# Patient Record
Sex: FEMALE | Race: WHITE | NOT HISPANIC OR LATINO | Employment: FULL TIME | URBAN - METROPOLITAN AREA
[De-identification: names, ages, dates, MRNs, and addresses within clinical notes are randomized per-mention and may not be internally consistent; named-entity substitution may affect disease eponyms.]

---

## 2016-08-08 LAB — CRC RECOMMENDATION EXT: NORMAL

## 2018-12-14 PROBLEM — I10 ESSENTIAL HYPERTENSION: Status: ACTIVE | Noted: 2018-12-14

## 2020-02-12 PROBLEM — F41.8 DEPRESSION WITH ANXIETY: Status: ACTIVE | Noted: 2020-02-12

## 2020-02-12 PROBLEM — E03.9 HYPOTHYROIDISM: Status: ACTIVE | Noted: 2020-02-12

## 2020-02-12 PROBLEM — E66.811 CLASS 1 OBESITY DUE TO EXCESS CALORIES WITH SERIOUS COMORBIDITY AND BODY MASS INDEX (BMI) OF 32.0 TO 32.9 IN ADULT: Status: ACTIVE | Noted: 2020-02-12

## 2020-02-12 PROBLEM — E66.09 CLASS 1 OBESITY DUE TO EXCESS CALORIES WITH SERIOUS COMORBIDITY AND BODY MASS INDEX (BMI) OF 32.0 TO 32.9 IN ADULT: Status: ACTIVE | Noted: 2020-02-12

## 2020-03-04 ENCOUNTER — OFFICE VISIT (OUTPATIENT)
Dept: UROLOGY | Facility: CLINIC | Age: 60
End: 2020-03-04
Payer: COMMERCIAL

## 2020-03-04 ENCOUNTER — HOSPITAL ENCOUNTER (OUTPATIENT)
Dept: RADIOLOGY | Facility: HOSPITAL | Age: 60
Discharge: HOME | End: 2020-03-04
Attending: UROLOGY
Payer: COMMERCIAL

## 2020-03-04 VITALS — HEIGHT: 62 IN | WEIGHT: 182.56 LBS | BODY MASS INDEX: 33.6 KG/M2

## 2020-03-04 DIAGNOSIS — R31.9 HEMATURIA, UNSPECIFIED TYPE: ICD-10-CM

## 2020-03-04 DIAGNOSIS — R10.9 FLANK PAIN: ICD-10-CM

## 2020-03-04 DIAGNOSIS — R10.84 GENERALIZED ABDOMINAL PAIN: ICD-10-CM

## 2020-03-04 DIAGNOSIS — R31.29 MICROHEMATURIA: ICD-10-CM

## 2020-03-04 DIAGNOSIS — R39.89 BLADDER PAIN: ICD-10-CM

## 2020-03-04 DIAGNOSIS — N23 RENAL COLIC: ICD-10-CM

## 2020-03-04 LAB
BILIRUB SERPL-MCNC: ABNORMAL MG/DL
BLOOD URINE, POC: ABNORMAL
COLOR, POC UA: ABNORMAL
GLUCOSE UR QL STRIP: ABNORMAL
KETONES UR QL STRIP: ABNORMAL
LEUKOCYTE ESTERASE URINE, POC: ABNORMAL
NITRITE, POC UA: ABNORMAL
PH, POC UA: 5
PROTEIN, POC: ABNORMAL
SPECIFIC GRAVITY, POC UA: 1.02
UROBILINOGEN, POC UA: 0.2

## 2020-03-04 PROCEDURE — 99204 OFFICE O/P NEW MOD 45 MIN: CPT | Mod: 25,S$GLB,, | Performed by: UROLOGY

## 2020-03-04 PROCEDURE — 99999 PR PBB SHADOW E&M-EST. PATIENT-LVL III: ICD-10-PCS | Mod: PBBFAC,,, | Performed by: UROLOGY

## 2020-03-04 PROCEDURE — 74176 CT ABD & PELVIS W/O CONTRAST: CPT | Mod: TC,PO

## 2020-03-04 PROCEDURE — 81002 URINALYSIS NONAUTO W/O SCOPE: CPT | Mod: S$GLB,,, | Performed by: UROLOGY

## 2020-03-04 PROCEDURE — 81002 POCT URINE DIPSTICK WITHOUT MICROSCOPE: ICD-10-PCS | Mod: S$GLB,,, | Performed by: UROLOGY

## 2020-03-04 PROCEDURE — 99204 PR OFFICE/OUTPT VISIT, NEW, LEVL IV, 45-59 MIN: ICD-10-PCS | Mod: 25,S$GLB,, | Performed by: UROLOGY

## 2020-03-04 PROCEDURE — 74176 CT RENAL STONE STUDY ABD PELVIS WO: ICD-10-PCS | Mod: 26,,, | Performed by: RADIOLOGY

## 2020-03-04 PROCEDURE — 99999 PR PBB SHADOW E&M-EST. PATIENT-LVL III: CPT | Mod: PBBFAC,,, | Performed by: UROLOGY

## 2020-03-04 PROCEDURE — 3008F BODY MASS INDEX DOCD: CPT | Mod: CPTII,S$GLB,, | Performed by: UROLOGY

## 2020-03-04 PROCEDURE — 3008F PR BODY MASS INDEX (BMI) DOCUMENTED: ICD-10-PCS | Mod: CPTII,S$GLB,, | Performed by: UROLOGY

## 2020-03-04 PROCEDURE — 74176 CT ABD & PELVIS W/O CONTRAST: CPT | Mod: 26,,, | Performed by: RADIOLOGY

## 2020-03-04 NOTE — LETTER
March 4, 2020      Joanie Sarah MD  [address]  [address]  [place] [address]           [facility]  [address] [place] [address]  Phone: [phone]  Fax: [phone]          Patient: Juliana Ochoa   MR Number: [id number]   YOB: 1960   Date of Visit: 3/4/2020       Dear Dr. Joanie Sarah:    Thank you for referring Juliana Ochoa to me for evaluation. Attached you will find relevant portions of my assessment and plan of care.    If you have questions, please do not hesitate to call me. I look forward to following Juliana Ochoa along with you.    Sincerely,    Noman Mills MD    Enclosure  CC:  No Recipients    If you would like to receive this communication electronically, please contact [email or web address] or [phone] to request more information on [employer] Link access.    For providers and/or their staff who would like to refer a patient to [facility], please contact us through our one-stop-shop provider referral line, [facility], at [phone].    If you feel you have received this communication in error or would no longer like to receive these types of communications, please e-mail [email or web address]

## 2020-03-04 NOTE — PROGRESS NOTES
Subjective:       Patient ID: Juliana Ochoa is a 59 y.o. female.    Chief Complaint: Complains of intermittent left lower quadrant pain. Recently passe some blood in urine.    Patient recently passed some blood in urine. She has had some intermittent left flank pain. No nausea or vomiting    Past Medical History:   Diagnosis Date    Asthma     Esophageal dilatation     GERD (gastroesophageal reflux disease)     Hypertension     Hypothyroidism     Peptic ulcer       Past Surgical History:   Procedure Laterality Date     SECTION      UPPER GASTROINTESTINAL ENDOSCOPY       Social History     Socioeconomic History    Marital status: Single     Spouse name: Not on file    Number of children: Not on file    Years of education: Not on file    Highest education level: Not on file   Occupational History    Not on file   Social Needs    Financial resource strain: Not on file    Food insecurity:     Worry: Not on file     Inability: Not on file    Transportation needs:     Medical: Not on file     Non-medical: Not on file   Tobacco Use    Smoking status: Never Smoker    Smokeless tobacco: Never Used   Substance and Sexual Activity    Alcohol use: Yes     Alcohol/week: 0.0 standard drinks     Comment: occasional    Drug use: No    Sexual activity: Not on file   Lifestyle    Physical activity:     Days per week: Not on file     Minutes per session: Not on file    Stress: Not on file   Relationships    Social connections:     Talks on phone: Not on file     Gets together: Not on file     Attends Sabianist service: Not on file     Active member of club or organization: Not on file     Attends meetings of clubs or organizations: Not on file     Relationship status: Not on file   Other Topics Concern    Not on file   Social History Narrative    Not on file       Family History   Problem Relation Age of Onset    Heart disease Mother     Glaucoma Mother     Stroke Father     Coronary artery  disease Father       Review of patient's allergies indicates:   Allergen Reactions    Codeine Nausea And Vomiting     Medication List with Changes/Refills   Current Medications    ALBUTEROL (PROAIR HFA) 90 MCG/ACTUATION INHALER    Inhale 2 puffs into the lungs every 4 (four) hours as needed for Shortness of Breath. Rescue    ATENOLOL (TENORMIN) 50 MG TABLET    Take 1 tablet (50 mg total) by mouth once daily.    BECLOMETHASONE (QVAR) 80 MCG/ACTUATION AERO    Inhale 1 puff into the lungs 2 (two) times daily. Controller    BUPROPION (WELLBUTRIN) 75 MG TABLET    Take 1 tablet (75 mg total) by mouth 2 (two) times daily.    DOXYCYCLINE CALCIUM ORAL    Take by mouth.    HYDROCORTISONE 2.5 % CREAM    APPLY to RASH on face when flared twice daily    LEVOTHYROXINE (SYNTHROID) 75 MCG TABLET    Take 1 tablet (75 mcg total) by mouth before breakfast.      Review of Systems   Constitutional: Positive for activity change and fatigue.   HENT: Negative.    Eyes: Negative.    Respiratory: Negative.    Cardiovascular: Negative.    Gastrointestinal: Positive for abdominal pain.   Endocrine: Negative.    Genitourinary: Positive for hematuria.   Musculoskeletal: Negative.    Neurological: Negative.    Hematological: Negative.    Psychiatric/Behavioral: Negative.        Objective:      Physical Exam   Nursing note and vitals reviewed.  Constitutional: She is oriented to person, place, and time. She appears well-developed and well-nourished.   HENT:   Head: Normocephalic and atraumatic.   Eyes: EOM are normal. Pupils are equal, round, and reactive to light.   Neck: Normal range of motion. Neck supple.   Cardiovascular: Normal rate.    Pulmonary/Chest: Effort normal.   Abdominal: Soft.   Musculoskeletal: Normal range of motion.   Neurological: She is alert and oriented to person, place, and time.   Skin: Skin is warm and dry.     Psychiatric: She has a normal mood and affect. Her behavior is normal. Judgment and thought content normal.          Sodium   Date Value Ref Range Status   02/12/2020 141 136 - 145 mmol/L Final     Potassium   Date Value Ref Range Status   02/12/2020 4.3 3.5 - 5.1 mmol/L Final     Chloride   Date Value Ref Range Status   02/12/2020 105 95 - 110 mmol/L Final     CO2   Date Value Ref Range Status   02/12/2020 24 22 - 31 mmol/L Final     Creatinine   Date Value Ref Range Status   02/12/2020 0.73 0.50 - 1.40 mg/dL Final     Glucose   Date Value Ref Range Status   02/12/2020 102 70 - 110 mg/dL Final     Comment:     The ADA recommends the following guidelines for fasting glucose:  Normal:       less than 100 mg/dL  Prediabetes:  100 mg/dL to 125 mg/dL  Diabetes:     126 mg/dL or higher       AST   Date Value Ref Range Status   02/12/2020 21 14 - 36 U/L Final     ALT   Date Value Ref Range Status   02/12/2020 19 0 - 35 U/L Final     WBC   Date Value Ref Range Status   02/12/2020 6.37 3.90 - 12.70 K/uL Final     Hemoglobin   Date Value Ref Range Status   02/12/2020 14.9 12.0 - 16.0 g/dL Final     Hematocrit   Date Value Ref Range Status   02/12/2020 46.9 37.0 - 48.5 % Final     Platelets   Date Value Ref Range Status   02/12/2020 225 150 - 350 K/uL Final          Assessment/Plan: Left lower quadrant pain with recent hematuria - Check ct scan stone                                           protocol                                        If ct negative, will plan cystoscopy        Problem List Items Addressed This Visit     None      Visit Diagnoses     Hematuria, unspecified type        Relevant Orders    POCT urine dipstick without microscope (Completed)    Bladder pain        Relevant Orders    POCT urine dipstick without microscope (Completed)    Generalized abdominal pain        Relevant Orders    CT Renal Stone Study ABD Pelvis WO    Flank pain        Relevant Orders    CT Renal Stone Study ABD Pelvis WO

## 2020-03-08 PROBLEM — N23 RENAL COLIC: Status: ACTIVE | Noted: 2020-03-08

## 2020-03-08 PROBLEM — R31.29 MICROHEMATURIA: Status: ACTIVE | Noted: 2020-03-08

## 2020-12-15 NOTE — PROGRESS NOTES
======================================================  GOAL of current visit: ESTABLISH CARE  Previous PCP: Dr. Joanie Sarah [facility]  List all Specialists: Opthalmology:  Dr. Munoz                                  Urology:  Dr. Gillian Mills                                  GYN:  Dr. Yael Loyola [facility]  Date of Last Annual Wellness:  ======================================================  PLAN:      Problem List Items Addressed This Visit     Class 2 severe obesity due to excess calories with serious comorbidity and body mass index (BMI) of 38.0 to 38.9 in adult    Rash - Primary     Patient has tried multiple different creams and ointments without relief.  She has not tried treating for fungal disease.  I recommend patient follow-up with a dermatologist for a biopsy soon.  Will try Lotrisone in the meantime.Discussed condition course and signs and symptoms to expect.  Patient advised take anti-inflammatories and or Tylenol for pain or fever.  ER precautions.  Call MD or follow-up to clinic if not improving or worsening symptoms.           Relevant Medications    clotrimazole-betamethasone 1-0.05% (LOTRISONE) cream    Other Relevant Orders    Ambulatory referral/consult to Dermatology    Encounter for long-term (current) use of medications     Reviewed labs.Complete history and physical was completed today.  Complete and thorough medication reconciliation was performed.  Discussed risks and benefits of medications.  Advised patient on orders and health maintenance.  We discussed old records and old labs if available.  Will request any records not available through epic.  Continue current medications listed on your summary sheet.           Relevant Orders    CBC Without Differential    TSH    Comprehensive Metabolic Panel    Hemoglobin A1C    Lipid Panel      Other Visit Diagnoses     Well adult exam        Relevant Orders    CBC Without Differential    TSH    Comprehensive Metabolic Panel    Hemoglobin  A1C    Lipid Panel    Encounter for lipid screening for cardiovascular disease        Relevant Orders    Lipid Panel        Future Appointments     Date Provider Specialty Appt Notes    1/20/2021 Alessandra Ryan MD Dermatology Rash [R21]    4/15/2021 Umair Romo MD Family Medicine 4 mo f/u           Medication List with Changes/Refills   New Medications    CLOTRIMAZOLE-BETAMETHASONE 1-0.05% (LOTRISONE) CREAM    Apply topically 2 (two) times daily.   Current Medications    ALBUTEROL (PROAIR HFA) 90 MCG/ACTUATION INHALER    Inhale 2 puffs into the lungs every 4 (four) hours as needed for Shortness of Breath. Rescue    ATENOLOL (TENORMIN) 50 MG TABLET    Take 1 tablet (50 mg total) by mouth once daily.    BECLOMETHASONE (QVAR) 80 MCG/ACTUATION AERO    Inhale 1 puff into the lungs 2 (two) times daily. Controller    BUPROPION (WELLBUTRIN) 75 MG TABLET    TAKE ONE TABLET BY MOUTH twice daily    HYDROCORTISONE 2.5 % CREAM    APPLY to RASH on face when flared twice daily    LEVOTHYROXINE (SYNTHROID) 75 MCG TABLET    TAKE ONE TABLET BY MOUTH EVERY MORNING before breakfast    MONTELUKAST (SINGULAIR) 10 MG TABLET    TAKE ONE TABLET BY MOUTH ONCE DAILY IN THE EVENING   Discontinued Medications    DOXYCYCLINE CALCIUM ORAL    Take by mouth.       Umair Romo M.D.     ==========================================================================  Subjective:      Patient ID: Juliana Ochoa is a 60 y.o. female.  has a past medical history of Asthma, Esophageal dilatation, GERD (gastroesophageal reflux disease), Hypertension, Hypothyroidism, and Peptic ulcer.     Chief Complaint: Rash (left leg)      Problem List Items Addressed This Visit     Class 2 severe obesity due to excess calories with serious comorbidity and body mass index (BMI) of 38.0 to 38.9 in adult    Rash - Primary    Overview     Subacute to chronic.  Started about three months ago.  Patient reports that it is in the same location on the left lower  extremity.  Patient has tried a number of different products in creams.  See photo.  Patient also has history of rosacea.  She reports that the area is itching and sometimes painful.  Patient has been applying calamine lotion today.               Current Assessment & Plan     Patient has tried multiple different creams and ointments without relief.  She has not tried treating for fungal disease.  I recommend patient follow-up with a dermatologist for a biopsy soon.  Will try Lotrisone in the meantime.Discussed condition course and signs and symptoms to expect.  Patient advised take anti-inflammatories and or Tylenol for pain or fever.  ER precautions.  Call MD or follow-up to clinic if not improving or worsening symptoms.           Encounter for long-term (current) use of medications    Overview     CHRONIC. Stable. Compliant with medications for managed conditions. See medication list. No SE reported.   Routine lab analysis is being monitored. Refills were addressed.  Lab Results   Component Value Date    WBC 6.11 12/16/2020    HGB 13.7 12/16/2020    HCT 43.9 12/16/2020    MCV 92 12/16/2020     12/16/2020         Chemistry        Component Value Date/Time     02/12/2020 1200    K 4.3 02/12/2020 1200     02/12/2020 1200    CO2 24 02/12/2020 1200    BUN 19 (H) 02/12/2020 1200    CREATININE 0.73 02/12/2020 1200     02/12/2020 1200        Component Value Date/Time    CALCIUM 9.7 02/12/2020 1200    ALKPHOS 57 02/12/2020 1200    AST 21 02/12/2020 1200    ALT 19 02/12/2020 1200    BILITOT 0.9 02/12/2020 1200    ESTGFRAFRICA >60 02/12/2020 1200    EGFRNONAA >60 02/12/2020 1200          Lab Results   Component Value Date    TSH 0.884 02/12/2020              Current Assessment & Plan     Reviewed labs.Complete history and physical was completed today.  Complete and thorough medication reconciliation was performed.  Discussed risks and benefits of medications.  Advised patient on orders and health  maintenance.  We discussed old records and old labs if available.  Will request any records not available through epic.  Continue current medications listed on your summary sheet.             Other Visit Diagnoses     Well adult exam        Encounter for lipid screening for cardiovascular disease               Past Medical History:  Past Medical History:   Diagnosis Date    Asthma     Esophageal dilatation     GERD (gastroesophageal reflux disease)     Hypertension     Hypothyroidism     Peptic ulcer      Past Surgical History:   Procedure Laterality Date     SECTION      UPPER GASTROINTESTINAL ENDOSCOPY       Review of patient's allergies indicates:  No Known Allergies  Medication List with Changes/Refills   New Medications    CLOTRIMAZOLE-BETAMETHASONE 1-0.05% (LOTRISONE) CREAM    Apply topically 2 (two) times daily.   Current Medications    ALBUTEROL (PROAIR HFA) 90 MCG/ACTUATION INHALER    Inhale 2 puffs into the lungs every 4 (four) hours as needed for Shortness of Breath. Rescue    ATENOLOL (TENORMIN) 50 MG TABLET    Take 1 tablet (50 mg total) by mouth once daily.    BECLOMETHASONE (QVAR) 80 MCG/ACTUATION AERO    Inhale 1 puff into the lungs 2 (two) times daily. Controller    BUPROPION (WELLBUTRIN) 75 MG TABLET    TAKE ONE TABLET BY MOUTH twice daily    HYDROCORTISONE 2.5 % CREAM    APPLY to RASH on face when flared twice daily    LEVOTHYROXINE (SYNTHROID) 75 MCG TABLET    TAKE ONE TABLET BY MOUTH EVERY MORNING before breakfast    MONTELUKAST (SINGULAIR) 10 MG TABLET    TAKE ONE TABLET BY MOUTH ONCE DAILY IN THE EVENING   Discontinued Medications    DOXYCYCLINE CALCIUM ORAL    Take by mouth.      Social History     Tobacco Use    Smoking status: Never Smoker    Smokeless tobacco: Never Used   Substance Use Topics    Alcohol use: Yes     Alcohol/week: 0.0 standard drinks     Comment: occasional      Family History   Problem Relation Age of Onset    Heart disease Mother     Glaucoma Mother   "   Stroke Father     Coronary artery disease Father        I have reviewed the complete PMH, social history, surgical history, allergies and medications.  As well as family history.    Review of Systems   Constitutional: Negative for activity change and fatigue.   HENT: Negative for congestion and sinus pain.    Eyes: Negative for visual disturbance.   Respiratory: Negative for chest tightness and shortness of breath.    Cardiovascular: Negative for palpitations and leg swelling.   Gastrointestinal: Negative for abdominal pain, diarrhea and nausea.   Endocrine: Negative for polyuria.   Genitourinary: Negative for difficulty urinating and frequency.   Musculoskeletal: Negative for arthralgias and joint swelling.   Skin: Positive for rash.   Neurological: Negative for dizziness and headaches.   Psychiatric/Behavioral: Negative for agitation. The patient is not nervous/anxious.      Objective:   /85   Pulse 64   Temp 98.5 °F (36.9 °C) (Oral)   Ht 5' 2" (1.575 m)   Wt 95.2 kg (209 lb 12.8 oz)   BMI 38.37 kg/m²   Physical Exam  Vitals signs and nursing note reviewed.   Constitutional:       General: She is not in acute distress.     Appearance: She is well-developed.   HENT:      Head: Normocephalic and atraumatic.      Right Ear: External ear normal.      Left Ear: External ear normal.      Nose: Nose normal. No rhinorrhea.   Eyes:      Extraocular Movements: Extraocular movements intact.      Pupils: Pupils are equal, round, and reactive to light.   Neck:      Musculoskeletal: Normal range of motion and neck supple.   Cardiovascular:      Rate and Rhythm: Normal rate.      Pulses: Normal pulses.   Pulmonary:      Effort: Pulmonary effort is normal. No respiratory distress.      Breath sounds: Normal breath sounds.   Abdominal:      General: Bowel sounds are normal.      Palpations: Abdomen is soft.   Musculoskeletal: Normal range of motion.   Skin:     General: Skin is warm and dry.      Capillary Refill: " Capillary refill takes less than 2 seconds.      Findings: Rash present.   Neurological:      General: No focal deficit present.      Mental Status: She is alert and oriented to person, place, and time.   Psychiatric:         Mood and Affect: Mood normal.         Behavior: Behavior normal.             Assessment:     1. Rash    2. Well adult exam    3. Encounter for long-term (current) use of medications    4. Encounter for lipid screening for cardiovascular disease    5. Class 2 severe obesity due to excess calories with serious comorbidity and body mass index (BMI) of 38.0 to 38.9 in adult      MDM:   Moderate complexity.  Moderate risk.  Patient is a new patient.  I have Reviewed and summarized old records.  I have performed thorough medication reconciliation today and discussed risk and benefits of each medication.  I have reviewed labs and discussed with patient.  All questions were answered.  I am requesting old records and will review them once they are available.    I have signed for the following orders AND/OR meds.  Orders Placed This Encounter   Procedures    CBC Without Differential     Standing Status:   Standing     Number of Occurrences:   99     Standing Expiration Date:   2/14/2022    TSH     Standing Status:   Standing     Number of Occurrences:   99     Standing Expiration Date:   2/14/2022    Comprehensive Metabolic Panel     Standing Status:   Standing     Number of Occurrences:   99     Standing Expiration Date:   12/11/2040    Hemoglobin A1C     Standing Status:   Standing     Number of Occurrences:   99     Standing Expiration Date:   12/11/2040    Lipid Panel     Standing Status:   Standing     Number of Occurrences:   99     Standing Expiration Date:   12/11/2040    Ambulatory referral/consult to Dermatology     Standing Status:   Future     Standing Expiration Date:   1/16/2022     Referral Priority:   Urgent     Referral Type:   Consultation     Referral Reason:   Specialty Services  Required     Requested Specialty:   Dermatology     Number of Visits Requested:   1     Medications Ordered This Encounter   Medications    clotrimazole-betamethasone 1-0.05% (LOTRISONE) cream     Sig: Apply topically 2 (two) times daily.     Dispense:  1 Tube     Refill:  0        Follow up in about 4 months (around 4/16/2021), or if symptoms worsen or fail to improve, for Annual Wellness Exam, As scheduled.    If no improvement in symptoms or symptoms worsen, advised to call/follow-up at clinic or go to ER. Patient voiced understanding and all questions/concerns were addressed.     DISCLAIMER: This note was compiled by using a speech recognition dictation system and therefore please be aware that typographical / speech recognition errors can and do occur.  Please contact me if you see any errors specifically.    Umair Romo M.D.       Office: [phone] [address] [place] [address]  FAX: [phone]

## 2020-12-16 ENCOUNTER — OFFICE VISIT (OUTPATIENT)
Dept: FAMILY MEDICINE | Facility: CLINIC | Age: 60
End: 2020-12-16
Payer: COMMERCIAL

## 2020-12-16 ENCOUNTER — LAB VISIT (OUTPATIENT)
Dept: LAB | Facility: HOSPITAL | Age: 60
End: 2020-12-16
Attending: FAMILY MEDICINE
Payer: COMMERCIAL

## 2020-12-16 VITALS
HEIGHT: 62 IN | SYSTOLIC BLOOD PRESSURE: 136 MMHG | DIASTOLIC BLOOD PRESSURE: 85 MMHG | WEIGHT: 209.81 LBS | HEART RATE: 64 BPM | TEMPERATURE: 99 F | BODY MASS INDEX: 38.61 KG/M2

## 2020-12-16 DIAGNOSIS — Z13.6 ENCOUNTER FOR LIPID SCREENING FOR CARDIOVASCULAR DISEASE: ICD-10-CM

## 2020-12-16 DIAGNOSIS — Z00.00 WELL ADULT EXAM: ICD-10-CM

## 2020-12-16 DIAGNOSIS — Z79.899 ENCOUNTER FOR LONG-TERM (CURRENT) USE OF MEDICATIONS: ICD-10-CM

## 2020-12-16 DIAGNOSIS — E66.01 CLASS 2 SEVERE OBESITY DUE TO EXCESS CALORIES WITH SERIOUS COMORBIDITY AND BODY MASS INDEX (BMI) OF 38.0 TO 38.9 IN ADULT: ICD-10-CM

## 2020-12-16 DIAGNOSIS — R21 RASH: Primary | ICD-10-CM

## 2020-12-16 DIAGNOSIS — Z13.220 ENCOUNTER FOR LIPID SCREENING FOR CARDIOVASCULAR DISEASE: ICD-10-CM

## 2020-12-16 PROBLEM — E66.812 CLASS 2 SEVERE OBESITY DUE TO EXCESS CALORIES WITH SERIOUS COMORBIDITY AND BODY MASS INDEX (BMI) OF 38.0 TO 38.9 IN ADULT: Status: ACTIVE | Noted: 2020-02-12

## 2020-12-16 LAB
ALBUMIN SERPL BCP-MCNC: 4.2 G/DL (ref 3.5–5.2)
ALP SERPL-CCNC: 58 U/L (ref 55–135)
ALT SERPL W/O P-5'-P-CCNC: 16 U/L (ref 10–44)
ANION GAP SERPL CALC-SCNC: 10 MMOL/L (ref 8–16)
AST SERPL-CCNC: 17 U/L (ref 10–40)
BILIRUB SERPL-MCNC: 1 MG/DL (ref 0.1–1)
BUN SERPL-MCNC: 14 MG/DL (ref 8–23)
CALCIUM SERPL-MCNC: 9.3 MG/DL (ref 8.7–10.5)
CHLORIDE SERPL-SCNC: 105 MMOL/L (ref 95–110)
CHOLEST SERPL-MCNC: 198 MG/DL (ref 120–199)
CHOLEST/HDLC SERPL: 3 {RATIO} (ref 2–5)
CO2 SERPL-SCNC: 25 MMOL/L (ref 23–29)
CREAT SERPL-MCNC: 0.8 MG/DL (ref 0.5–1.4)
ERYTHROCYTE [DISTWIDTH] IN BLOOD BY AUTOMATED COUNT: 13.9 % (ref 11.5–14.5)
EST. GFR  (AFRICAN AMERICAN): >60 ML/MIN/1.73 M^2
EST. GFR  (NON AFRICAN AMERICAN): >60 ML/MIN/1.73 M^2
ESTIMATED AVG GLUCOSE: 111 MG/DL (ref 68–131)
GLUCOSE SERPL-MCNC: 90 MG/DL (ref 70–110)
HBA1C MFR BLD HPLC: 5.5 % (ref 4–5.6)
HCT VFR BLD AUTO: 43.9 % (ref 37–48.5)
HDLC SERPL-MCNC: 67 MG/DL (ref 40–75)
HDLC SERPL: 33.8 % (ref 20–50)
HGB BLD-MCNC: 13.7 G/DL (ref 12–16)
LDLC SERPL CALC-MCNC: 116 MG/DL (ref 63–159)
MCH RBC QN AUTO: 28.8 PG (ref 27–31)
MCHC RBC AUTO-ENTMCNC: 31.2 G/DL (ref 32–36)
MCV RBC AUTO: 92 FL (ref 82–98)
NONHDLC SERPL-MCNC: 131 MG/DL
PLATELET # BLD AUTO: 224 K/UL (ref 150–350)
PMV BLD AUTO: 10.7 FL (ref 9.2–12.9)
POTASSIUM SERPL-SCNC: 4.3 MMOL/L (ref 3.5–5.1)
PROT SERPL-MCNC: 7.5 G/DL (ref 6–8.4)
RBC # BLD AUTO: 4.75 M/UL (ref 4–5.4)
SODIUM SERPL-SCNC: 140 MMOL/L (ref 136–145)
TRIGL SERPL-MCNC: 75 MG/DL (ref 30–150)
TSH SERPL DL<=0.005 MIU/L-ACNC: 1.2 UIU/ML (ref 0.4–4)
WBC # BLD AUTO: 6.11 K/UL (ref 3.9–12.7)

## 2020-12-16 PROCEDURE — 3008F PR BODY MASS INDEX (BMI) DOCUMENTED: ICD-10-PCS | Mod: CPTII,S$GLB,, | Performed by: FAMILY MEDICINE

## 2020-12-16 PROCEDURE — 1126F AMNT PAIN NOTED NONE PRSNT: CPT | Mod: S$GLB,,, | Performed by: FAMILY MEDICINE

## 2020-12-16 PROCEDURE — 99999 PR PBB SHADOW E&M-EST. PATIENT-LVL V: ICD-10-PCS | Mod: PBBFAC,,, | Performed by: FAMILY MEDICINE

## 2020-12-16 PROCEDURE — 3008F BODY MASS INDEX DOCD: CPT | Mod: CPTII,S$GLB,, | Performed by: FAMILY MEDICINE

## 2020-12-16 PROCEDURE — 36415 COLL VENOUS BLD VENIPUNCTURE: CPT | Mod: PO

## 2020-12-16 PROCEDURE — 84443 ASSAY THYROID STIM HORMONE: CPT

## 2020-12-16 PROCEDURE — 3079F DIAST BP 80-89 MM HG: CPT | Mod: CPTII,S$GLB,, | Performed by: FAMILY MEDICINE

## 2020-12-16 PROCEDURE — 1126F PR PAIN SEVERITY QUANTIFIED, NO PAIN PRESENT: ICD-10-PCS | Mod: S$GLB,,, | Performed by: FAMILY MEDICINE

## 2020-12-16 PROCEDURE — 85027 COMPLETE CBC AUTOMATED: CPT

## 2020-12-16 PROCEDURE — 3079F PR MOST RECENT DIASTOLIC BLOOD PRESSURE 80-89 MM HG: ICD-10-PCS | Mod: CPTII,S$GLB,, | Performed by: FAMILY MEDICINE

## 2020-12-16 PROCEDURE — 83036 HEMOGLOBIN GLYCOSYLATED A1C: CPT

## 2020-12-16 PROCEDURE — 80061 LIPID PANEL: CPT

## 2020-12-16 PROCEDURE — 99203 OFFICE O/P NEW LOW 30 MIN: CPT | Mod: S$GLB,,, | Performed by: FAMILY MEDICINE

## 2020-12-16 PROCEDURE — 99203 PR OFFICE/OUTPT VISIT, NEW, LEVL III, 30-44 MIN: ICD-10-PCS | Mod: S$GLB,,, | Performed by: FAMILY MEDICINE

## 2020-12-16 PROCEDURE — 99999 PR PBB SHADOW E&M-EST. PATIENT-LVL V: CPT | Mod: PBBFAC,,, | Performed by: FAMILY MEDICINE

## 2020-12-16 PROCEDURE — 3075F SYST BP GE 130 - 139MM HG: CPT | Mod: CPTII,S$GLB,, | Performed by: FAMILY MEDICINE

## 2020-12-16 PROCEDURE — 80053 COMPREHEN METABOLIC PANEL: CPT

## 2020-12-16 PROCEDURE — 3075F PR MOST RECENT SYSTOLIC BLOOD PRESS GE 130-139MM HG: ICD-10-PCS | Mod: CPTII,S$GLB,, | Performed by: FAMILY MEDICINE

## 2020-12-16 RX ORDER — CLOTRIMAZOLE AND BETAMETHASONE DIPROPIONATE 10; .64 MG/G; MG/G
CREAM TOPICAL 2 TIMES DAILY
Qty: 1 TUBE | Refills: 0 | Status: SHIPPED | OUTPATIENT
Start: 2020-12-16 | End: 2022-10-24 | Stop reason: SDUPTHER

## 2020-12-16 NOTE — PATIENT INSTRUCTIONS
Follow up in about 4 months (around 4/16/2021), or if symptoms worsen or fail to improve, for Annual Wellness Exam, As scheduled.     If no improvement in symptoms or symptoms worsen, please be advised to call MD, follow-up at clinic and/or go to ER if becomes severe.    Umair Romo M.D.        We Offer TELEHEALTH & Same Day Appointments!   Book your Telehealth appointment with me through my nurse or   Clinic appointments on [employer]!    [address] [place] [address]    Office: [phone]   FAX: [phone]    Check out my Facebook Page and Follow Me at: [email or web address]    Check out my website at [employer] by clicking on: [email or web address]    To Schedule appointments online, go to [employer]: [email or web address]

## 2020-12-16 NOTE — ASSESSMENT & PLAN NOTE
Patient has tried multiple different creams and ointments without relief.  She has not tried treating for fungal disease.  I recommend patient follow-up with a dermatologist for a biopsy soon.  Will try Lotrisone in the meantime.Discussed condition course and signs and symptoms to expect.  Patient advised take anti-inflammatories and or Tylenol for pain or fever.  ER precautions.  Call MD or follow-up to clinic if not improving or worsening symptoms.

## 2020-12-17 ENCOUNTER — PATIENT MESSAGE (OUTPATIENT)
Dept: FAMILY MEDICINE | Facility: CLINIC | Age: 60
End: 2020-12-17

## 2020-12-17 ENCOUNTER — TELEPHONE (OUTPATIENT)
Dept: FAMILY MEDICINE | Facility: CLINIC | Age: 60
End: 2020-12-17

## 2020-12-17 DIAGNOSIS — A49.9 BACTERIAL INFECTION: Primary | ICD-10-CM

## 2020-12-17 NOTE — TELEPHONE ENCOUNTER
----- Message from Umair Romo MD sent at 12/17/2020  6:20 AM CST -----  #CALL THE PATIENT# to discuss results/see if they have questions and document verification. Make FU appt if needed.    #Pend me the orders in my interpretation below.#    I have sent a message to them with the interpretation (see below).  See if they have any questions and make a follow-up appointment if not already schedule and if needed.    Also please address any outstanding health maintenance that may be due: HIV Screening due on 09/29/1973  Shingles Vaccine(1 of 2) due on 09/29/2008  Mammogram due on 07/13/2020  Influenza Vaccine(1) due on 08/01/2020  ====================================    My interpretation that was sent to them through [employer]:    Juliana, I have reviewed your recent blood work.     Your complete blood count is NORMAL NO ANEMIA..    Your metabolic panel which shows your glucose, kidney function, electrolytes, and liver function is NORMAL.   Thyroid studies are NORMAL.   Your cholesterol is NORMAL.    Your hemoglobin A1c is NORMAL.  This test is gold standard screening test for diabetes.  It is a measures 3 months of your average blood sugar.    REPEAT ANNUAL WELLNESS LABS IN ONE YEAR.

## 2020-12-17 NOTE — PROGRESS NOTES
#CALL THE PATIENT# to discuss results/see if they have questions and document verification. Make FU appt if needed.    #Pend me the orders in my interpretation below.#    I have sent a message to them with the interpretation (see below).  See if they have any questions and make a follow-up appointment if not already schedule and if needed.    Also please address any outstanding health maintenance that may be due: HIV Screening due on 09/29/1973  Shingles Vaccine(1 of 2) due on 09/29/2008  Mammogram due on 07/13/2020  Influenza Vaccine(1) due on 08/01/2020  ====================================    My interpretation that was sent to them through [employer]:    Juliana, I have reviewed your recent blood work.     Your complete blood count is NORMAL NO ANEMIA..    Your metabolic panel which shows your glucose, kidney function, electrolytes, and liver function is NORMAL.   Thyroid studies are NORMAL.   Your cholesterol is NORMAL.    Your hemoglobin A1c is NORMAL.  This test is gold standard screening test for diabetes.  It is a measures 3 months of your average blood sugar.    REPEAT ANNUAL WELLNESS LABS IN ONE YEAR.

## 2020-12-17 NOTE — TELEPHONE ENCOUNTER
Called [phone] left a message regarding calling the clinic back as results review showed only a couple of results were reviewed by patient.

## 2020-12-20 RX ORDER — DOXYCYCLINE 100 MG/1
100 CAPSULE ORAL EVERY 12 HOURS
Qty: 20 CAPSULE | Refills: 0 | Status: SHIPPED | OUTPATIENT
Start: 2020-12-20 | End: 2020-12-23 | Stop reason: SDUPTHER

## 2020-12-20 NOTE — TELEPHONE ENCOUNTER
I received your message which was reviewed along with the the medication list and allergies that we have below.  Please review it for accuracy to make sure that we have the most recent records on your history.     WE CAN TRY TO TREAT IT AS INFECTION UNTIL YOU CAN SEE DERMATOLOGY HOWEVER THE HISTORY AND APPEARANCE ARE UNLIKELY.     Based on this, the following orders were placed AND/OR medicines were sent in.     No orders of the defined types were placed in this encounter.      Medications written and sent at this time include:  Medications Ordered This Encounter   Medications    doxycycline (VIBRAMYCIN) 100 MG Cap     Sig: Take 1 capsule (100 mg total) by mouth every 12 (twelve) hours.     Dispense:  20 capsule     Refill:  0       Your pharmacy(ies) of choice at this time on record include the list below and any medications would have been sent to the one at the top.    [facility] [address] [address]  [address] [address]  [facility] [address]  Phone: [phone] Fax: [phone]      Thank you for choosing us as your healthcare provider!  Dr. Umair Romo    ALLERGY LIST  Review of patient's allergies indicates:  No Known Allergies    MEDICATION LIST  Current Outpatient Medications on File Prior to Visit   Medication Sig Dispense Refill    albuterol (PROAIR HFA) 90 mcg/actuation inhaler Inhale 2 puffs into the lungs every 4 (four) hours as needed for Shortness of Breath. Rescue 18 g 2    atenoloL (TENORMIN) 50 MG tablet Take 1 tablet (50 mg total) by mouth once daily. 90 tablet 3    beclomethasone (QVAR) 80 mcg/actuation Aero Inhale 1 puff into the lungs 2 (two) times daily. Controller 120 each 0    buPROPion (WELLBUTRIN) 75 MG tablet TAKE ONE TABLET BY MOUTH twice daily 60 tablet 2    clotrimazole-betamethasone 1-0.05% (LOTRISONE) cream Apply topically 2 (two) times daily. 1 Tube 0    hydrocortisone 2.5 % cream APPLY to RASH on face when flared twice daily  0    levothyroxine  (SYNTHROID) 75 MCG tablet TAKE ONE TABLET BY MOUTH EVERY MORNING before breakfast 90 tablet 0    montelukast (SINGULAIR) 10 mg tablet TAKE ONE TABLET BY MOUTH ONCE DAILY IN THE EVENING 90 tablet 2     No current facility-administered medications on file prior to visit.        HEALTH MAINTENANCE THAT IS OVERDUE OR NEEDS TO BE UPDATED ON OUR CHART IS LISTED BELOW.  IF YOU HAVE HAD IT DONE ELSEWHERE, PLEASE SEND US DATES AND RECORDS IF YOU HAVE THEM TO MAKE YOUR CHART ACCURATE.  IF YOU HAVE NOT HAD THESE DONE AND ARE READY FOR US TO SCHEDULE THEM, PLEASE SEND US A MESSAGE.  Health Maintenance Due   Topic Date Due    HIV Screening  09/29/1973    Shingles Vaccine (1 of 2) 09/29/2008    Mammogram  07/13/2020    Influenza Vaccine (1) 08/01/2020       DISCLAIMER: This note was compiled by using a speech recognition dictation system and therefore please be aware that typographical / speech recognition errors can and do occur.  Please contact me if you see any errors specifically.    Umair Romo MD  We Offer Telehealth & Same Day Appointments!   Book your Telehealth appointment with me through my nurse or   Clinic appointments on [employer]!  [phone]     Check out my Facebook Page and Follow Me at: CLICK HERE    Check out my website at [employer] by clicking on: CLICK HERE    To Schedule appointments online, go to [employer]: CLICK HERE     Location: [email or web address]    [address] [place] [address]    FAX: [phone]

## 2021-06-21 ENCOUNTER — TELEPHONE (OUTPATIENT)
Dept: ORTHOPEDICS | Facility: CLINIC | Age: 61
End: 2021-06-21

## 2021-06-21 DIAGNOSIS — M25.562 LEFT KNEE PAIN, UNSPECIFIED CHRONICITY: Primary | ICD-10-CM

## 2022-02-07 ENCOUNTER — LAB VISIT (OUTPATIENT)
Dept: LAB | Facility: HOSPITAL | Age: 62
End: 2022-02-07
Attending: STUDENT IN AN ORGANIZED HEALTH CARE EDUCATION/TRAINING PROGRAM
Payer: COMMERCIAL

## 2022-02-07 ENCOUNTER — OFFICE VISIT (OUTPATIENT)
Dept: FAMILY MEDICINE | Facility: CLINIC | Age: 62
End: 2022-02-07
Payer: COMMERCIAL

## 2022-02-07 VITALS
BODY MASS INDEX: 39.75 KG/M2 | HEART RATE: 57 BPM | HEIGHT: 62 IN | WEIGHT: 216 LBS | TEMPERATURE: 98 F | DIASTOLIC BLOOD PRESSURE: 76 MMHG | SYSTOLIC BLOOD PRESSURE: 130 MMHG

## 2022-02-07 DIAGNOSIS — J45.20 MILD INTERMITTENT ASTHMA WITHOUT COMPLICATION: ICD-10-CM

## 2022-02-07 DIAGNOSIS — Z13.220 ENCOUNTER FOR SCREENING FOR LIPID DISORDER: ICD-10-CM

## 2022-02-07 DIAGNOSIS — I10 ESSENTIAL HYPERTENSION: ICD-10-CM

## 2022-02-07 DIAGNOSIS — Z12.31 ENCOUNTER FOR SCREENING MAMMOGRAM FOR MALIGNANT NEOPLASM OF BREAST: Primary | ICD-10-CM

## 2022-02-07 DIAGNOSIS — J45.41 MODERATE PERSISTENT ASTHMA WITH ACUTE EXACERBATION: ICD-10-CM

## 2022-02-07 DIAGNOSIS — E66.01 CLASS 2 SEVERE OBESITY DUE TO EXCESS CALORIES WITH SERIOUS COMORBIDITY AND BODY MASS INDEX (BMI) OF 38.0 TO 38.9 IN ADULT: ICD-10-CM

## 2022-02-07 DIAGNOSIS — E03.9 ACQUIRED HYPOTHYROIDISM: ICD-10-CM

## 2022-02-07 PROBLEM — R21 RASH: Status: RESOLVED | Noted: 2020-12-16 | Resolved: 2022-02-07

## 2022-02-07 LAB
ALBUMIN SERPL BCP-MCNC: 3.9 G/DL (ref 3.5–5.2)
ALP SERPL-CCNC: 75 U/L (ref 55–135)
ALT SERPL W/O P-5'-P-CCNC: 19 U/L (ref 10–44)
ANION GAP SERPL CALC-SCNC: 9 MMOL/L (ref 8–16)
AST SERPL-CCNC: 15 U/L (ref 10–40)
BASOPHILS # BLD AUTO: 0.07 K/UL (ref 0–0.2)
BASOPHILS NFR BLD: 1 % (ref 0–1.9)
BILIRUB SERPL-MCNC: 1.5 MG/DL (ref 0.1–1)
BUN SERPL-MCNC: 12 MG/DL (ref 8–23)
CALCIUM SERPL-MCNC: 10 MG/DL (ref 8.7–10.5)
CHLORIDE SERPL-SCNC: 107 MMOL/L (ref 95–110)
CHOLEST SERPL-MCNC: 204 MG/DL (ref 120–199)
CHOLEST/HDLC SERPL: 3.4 {RATIO} (ref 2–5)
CO2 SERPL-SCNC: 24 MMOL/L (ref 23–29)
CREAT SERPL-MCNC: 0.7 MG/DL (ref 0.5–1.4)
DIFFERENTIAL METHOD: ABNORMAL
EOSINOPHIL # BLD AUTO: 0.1 K/UL (ref 0–0.5)
EOSINOPHIL NFR BLD: 2.1 % (ref 0–8)
ERYTHROCYTE [DISTWIDTH] IN BLOOD BY AUTOMATED COUNT: 14.8 % (ref 11.5–14.5)
EST. GFR  (AFRICAN AMERICAN): >60 ML/MIN/1.73 M^2
EST. GFR  (NON AFRICAN AMERICAN): >60 ML/MIN/1.73 M^2
ESTIMATED AVG GLUCOSE: 114 MG/DL (ref 68–131)
GLUCOSE SERPL-MCNC: 100 MG/DL (ref 70–110)
HBA1C MFR BLD: 5.6 % (ref 4–5.6)
HCT VFR BLD AUTO: 42.9 % (ref 37–48.5)
HDLC SERPL-MCNC: 60 MG/DL (ref 40–75)
HDLC SERPL: 29.4 % (ref 20–50)
HGB BLD-MCNC: 13.4 G/DL (ref 12–16)
IMM GRANULOCYTES # BLD AUTO: 0.01 K/UL (ref 0–0.04)
IMM GRANULOCYTES NFR BLD AUTO: 0.1 % (ref 0–0.5)
LDLC SERPL CALC-MCNC: 127.4 MG/DL (ref 63–159)
LYMPHOCYTES # BLD AUTO: 1.9 K/UL (ref 1–4.8)
LYMPHOCYTES NFR BLD: 27.7 % (ref 18–48)
MCH RBC QN AUTO: 29.3 PG (ref 27–31)
MCHC RBC AUTO-ENTMCNC: 31.2 G/DL (ref 32–36)
MCV RBC AUTO: 94 FL (ref 82–98)
MONOCYTES # BLD AUTO: 0.6 K/UL (ref 0.3–1)
MONOCYTES NFR BLD: 8.4 % (ref 4–15)
NEUTROPHILS # BLD AUTO: 4.1 K/UL (ref 1.8–7.7)
NEUTROPHILS NFR BLD: 60.7 % (ref 38–73)
NONHDLC SERPL-MCNC: 144 MG/DL
NRBC BLD-RTO: 0 /100 WBC
PLATELET # BLD AUTO: 237 K/UL (ref 150–450)
PMV BLD AUTO: 10.8 FL (ref 9.2–12.9)
POTASSIUM SERPL-SCNC: 4.4 MMOL/L (ref 3.5–5.1)
PROT SERPL-MCNC: 7.3 G/DL (ref 6–8.4)
RBC # BLD AUTO: 4.57 M/UL (ref 4–5.4)
SODIUM SERPL-SCNC: 140 MMOL/L (ref 136–145)
TRIGL SERPL-MCNC: 83 MG/DL (ref 30–150)
TSH SERPL DL<=0.005 MIU/L-ACNC: 2.45 UIU/ML (ref 0.4–4)
WBC # BLD AUTO: 6.69 K/UL (ref 3.9–12.7)

## 2022-02-07 PROCEDURE — 1160F PR REVIEW ALL MEDS BY PRESCRIBER/CLIN PHARMACIST DOCUMENTED: ICD-10-PCS | Mod: CPTII,S$GLB,, | Performed by: STUDENT IN AN ORGANIZED HEALTH CARE EDUCATION/TRAINING PROGRAM

## 2022-02-07 PROCEDURE — 3075F SYST BP GE 130 - 139MM HG: CPT | Mod: CPTII,S$GLB,, | Performed by: STUDENT IN AN ORGANIZED HEALTH CARE EDUCATION/TRAINING PROGRAM

## 2022-02-07 PROCEDURE — 84443 ASSAY THYROID STIM HORMONE: CPT | Performed by: STUDENT IN AN ORGANIZED HEALTH CARE EDUCATION/TRAINING PROGRAM

## 2022-02-07 PROCEDURE — 1159F MED LIST DOCD IN RCRD: CPT | Mod: CPTII,S$GLB,, | Performed by: STUDENT IN AN ORGANIZED HEALTH CARE EDUCATION/TRAINING PROGRAM

## 2022-02-07 PROCEDURE — 83036 HEMOGLOBIN GLYCOSYLATED A1C: CPT | Performed by: STUDENT IN AN ORGANIZED HEALTH CARE EDUCATION/TRAINING PROGRAM

## 2022-02-07 PROCEDURE — 3008F PR BODY MASS INDEX (BMI) DOCUMENTED: ICD-10-PCS | Mod: CPTII,S$GLB,, | Performed by: STUDENT IN AN ORGANIZED HEALTH CARE EDUCATION/TRAINING PROGRAM

## 2022-02-07 PROCEDURE — 1159F PR MEDICATION LIST DOCUMENTED IN MEDICAL RECORD: ICD-10-PCS | Mod: CPTII,S$GLB,, | Performed by: STUDENT IN AN ORGANIZED HEALTH CARE EDUCATION/TRAINING PROGRAM

## 2022-02-07 PROCEDURE — 3008F BODY MASS INDEX DOCD: CPT | Mod: CPTII,S$GLB,, | Performed by: STUDENT IN AN ORGANIZED HEALTH CARE EDUCATION/TRAINING PROGRAM

## 2022-02-07 PROCEDURE — 99999 PR PBB SHADOW E&M-EST. PATIENT-LVL IV: CPT | Mod: PBBFAC,,, | Performed by: STUDENT IN AN ORGANIZED HEALTH CARE EDUCATION/TRAINING PROGRAM

## 2022-02-07 PROCEDURE — 3078F PR MOST RECENT DIASTOLIC BLOOD PRESSURE < 80 MM HG: ICD-10-PCS | Mod: CPTII,S$GLB,, | Performed by: STUDENT IN AN ORGANIZED HEALTH CARE EDUCATION/TRAINING PROGRAM

## 2022-02-07 PROCEDURE — 1160F RVW MEDS BY RX/DR IN RCRD: CPT | Mod: CPTII,S$GLB,, | Performed by: STUDENT IN AN ORGANIZED HEALTH CARE EDUCATION/TRAINING PROGRAM

## 2022-02-07 PROCEDURE — 80053 COMPREHEN METABOLIC PANEL: CPT | Performed by: STUDENT IN AN ORGANIZED HEALTH CARE EDUCATION/TRAINING PROGRAM

## 2022-02-07 PROCEDURE — 99999 PR PBB SHADOW E&M-EST. PATIENT-LVL IV: ICD-10-PCS | Mod: PBBFAC,,, | Performed by: STUDENT IN AN ORGANIZED HEALTH CARE EDUCATION/TRAINING PROGRAM

## 2022-02-07 PROCEDURE — 99214 PR OFFICE/OUTPT VISIT, EST, LEVL IV, 30-39 MIN: ICD-10-PCS | Mod: S$GLB,,, | Performed by: STUDENT IN AN ORGANIZED HEALTH CARE EDUCATION/TRAINING PROGRAM

## 2022-02-07 PROCEDURE — 80061 LIPID PANEL: CPT | Performed by: STUDENT IN AN ORGANIZED HEALTH CARE EDUCATION/TRAINING PROGRAM

## 2022-02-07 PROCEDURE — 85025 COMPLETE CBC W/AUTO DIFF WBC: CPT | Performed by: STUDENT IN AN ORGANIZED HEALTH CARE EDUCATION/TRAINING PROGRAM

## 2022-02-07 PROCEDURE — 99214 OFFICE O/P EST MOD 30 MIN: CPT | Mod: S$GLB,,, | Performed by: STUDENT IN AN ORGANIZED HEALTH CARE EDUCATION/TRAINING PROGRAM

## 2022-02-07 PROCEDURE — 3075F PR MOST RECENT SYSTOLIC BLOOD PRESS GE 130-139MM HG: ICD-10-PCS | Mod: CPTII,S$GLB,, | Performed by: STUDENT IN AN ORGANIZED HEALTH CARE EDUCATION/TRAINING PROGRAM

## 2022-02-07 PROCEDURE — 3078F DIAST BP <80 MM HG: CPT | Mod: CPTII,S$GLB,, | Performed by: STUDENT IN AN ORGANIZED HEALTH CARE EDUCATION/TRAINING PROGRAM

## 2022-02-07 PROCEDURE — 36415 COLL VENOUS BLD VENIPUNCTURE: CPT | Mod: PO | Performed by: STUDENT IN AN ORGANIZED HEALTH CARE EDUCATION/TRAINING PROGRAM

## 2022-02-07 RX ORDER — ALBUTEROL SULFATE 90 UG/1
2 AEROSOL, METERED RESPIRATORY (INHALATION) EVERY 4 HOURS PRN
Qty: 18 G | Refills: 2 | Status: SHIPPED | OUTPATIENT
Start: 2022-02-07 | End: 2023-01-31

## 2022-02-07 RX ORDER — ATENOLOL 50 MG/1
50 TABLET ORAL DAILY
Qty: 90 TABLET | Refills: 3
Start: 2022-02-07 | End: 2022-04-07

## 2022-02-07 NOTE — PROGRESS NOTES
Problem List Items Addressed This Visit        Pulmonary    Mild intermittent asthma    Overview     Chronic hx asthma; triggered by environmental allergies and exercise-induced   - prn flonase, claritin, albuterol inhaler ( 1-2x per month)  - Qvar for controller; however, has not been using as she has been controlled             Cardiac/Vascular    Essential hypertension    Overview     -at goal today  - Current Hypertension Medications:   Hypertension Medications             atenoloL (TENORMIN) 50 MG tablet Take 1 tablet (50 mg total) by mouth once daily.        -continue lifestyle modification with low sodium diet and exercise   -discussed hypertension disease course and importance of treating high blood pressure  -patient understood and advised of risk of untreated blood pressure.  ER precautions were given   for symptoms of hypertensive urgency and emergency.           Relevant Medications    atenoloL (TENORMIN) 50 MG tablet    Other Relevant Orders    CBC Auto Differential    Comprehensive Metabolic Panel    Hemoglobin A1C       Endocrine    Hypothyroidism    Overview     US Thyroid 2/'21    Nontoxic goiter, unspecified     TECHNIQUE:  Grayscale and color Doppler images of the thyroid gland are obtained for interpretation.     COMPARISON:  None     FINDINGS:  The right lobe of the thyroid measures 3.1 x 0.9 x 1.0 cm.  The left lobe measures 3.5 x 0.9 x 0.9 cm.  The thyroid isthmus measures 4 mm in thickness.  The thyroid parenchyma is heterogeneous.  There is a 4 x 8 x 7 mm well-circumscribed nodule in the mid right lobe of the thyroid.     Impression:     Heterogeneous thyroid parenchyma with a subcentimeter nodule in the mid right lobe which does not meet criteria for percutaneous sampling.            Relevant Orders    TSH       Other    Class 2 severe obesity due to excess calories with serious comorbidity and body mass index (BMI) of 38.0 to 38.9 in adult    Overview     The patient and I had a discussion  about obesity and ways to treat it.  At this time, we agreed to use the following to address this:    General weight loss/lifestyle modification strategies discussed (elicit support from others; identify saboteurs; non-food rewards, etc).  Informal exercise measures discussed, e.g. taking stairs instead of elevator.  Regular aerobic exercise program discussed.         previously on Optivia and lost 40 lbs; however, has gained 40lb back            Other Visit Diagnoses     Encounter for screening mammogram for malignant neoplasm of breast    -  Primary    Relevant Orders    Mammo Digital Screening Bilat w/ Saulo    Encounter for screening for lipid disorder        Relevant Orders    Lipid Panel    Moderate persistent asthma with acute exacerbation        Relevant Medications    albuterol (PROAIR HFA) 90 mcg/actuation inhaler            Patient ID: Juliana Ochoa is a 61 y.o. female.    Chief Complaint:  establish care    Previous PCP: PJ Montelongo     Patient is here to establish care. Has a hx of  has a past medical history of Asthma, Esophageal dilatation, GERD (gastroesophageal reflux disease), Hypertension, Hypothyroidism, Peptic ulcer, and Rosacea.        History:  OBGYN: n/a      LMP: No LMP recorded. Patient is postmenopausal.   MGM: No history of abnormal mammogram  PAP: 2018, nml - No history of abnormal pap smear  Colonoscopy: No personal history of colon caner, hematochezia, melena, crohn's, ulcerative colitis; No family history of colon cancer.    Health Maintenance Topics with due status: Not Due       Topic Last Completion Date    Colorectal Cancer Screening 08/08/2016    Pap Smear with HPV Cotest 12/13/2018    TETANUS VACCINE 06/19/2019    Lipid Panel 12/16/2020        ==============================================  History reviewed.   Health Maintenance Due   Topic Date Due    Mammogram  07/13/2020       Past Medical History:  Past Medical History:   Diagnosis Date    Asthma      Esophageal dilatation     GERD (gastroesophageal reflux disease)     Hypertension     Hypothyroidism     Peptic ulcer     Rosacea      Past Surgical History:   Procedure Laterality Date     SECTION      UPPER GASTROINTESTINAL ENDOSCOPY       Review of patient's allergies indicates:  No Known Allergies  Current Outpatient Medications on File Prior to Visit   Medication Sig Dispense Refill    clotrimazole-betamethasone 1-0.05% (LOTRISONE) cream Apply topically 2 (two) times daily. 1 Tube 0    hydrocortisone 2.5 % cream APPLY to RASH on face when flared twice daily  0    levothyroxine (SYNTHROID) 75 MCG tablet TAKE ONE TABLET BY MOUTH EVERY MORNING before breakfast 30 tablet 3    [DISCONTINUED] atenoloL (TENORMIN) 50 MG tablet Take 1 tablet (50 mg total) by mouth once daily. 90 tablet 2    beclomethasone (QVAR) 80 mcg/actuation Aero Inhale 1 puff into the lungs 2 (two) times daily. Controller 120 each 0    [DISCONTINUED] albuterol (PROAIR HFA) 90 mcg/actuation inhaler Inhale 2 puffs into the lungs every 4 (four) hours as needed for Shortness of Breath. Rescue 18 g 2    [DISCONTINUED] desonide 0.05% (DESOWEN) 0.05 % Oint APPLY TO FACE twice daily FOR UP TO TWO WEEKS      [DISCONTINUED] montelukast (SINGULAIR) 10 mg tablet Take 1 tablet (10 mg total) by mouth every evening. (Patient not taking: Reported on 2022) 90 tablet 2     No current facility-administered medications on file prior to visit.     Social History     Socioeconomic History    Marital status: Single   Tobacco Use    Smoking status: Never Smoker    Smokeless tobacco: Never Used   Substance and Sexual Activity    Alcohol use: Yes     Alcohol/week: 0.0 standard drinks     Comment: occasional    Drug use: No     Family History   Problem Relation Age of Onset    Heart disease Mother     Glaucoma Mother     Stroke Father     Coronary artery disease Father           Review of Systems   12 point review of systems per hpi,  otherwise negative         Objective:    Nursing note and vitals reviewed.  Vitals:    02/07/22 0748   BP: 130/76   Pulse: (!) 57   Temp: 97.9 °F (36.6 °C)     Body mass index is 39.51 kg/m².     Physical Exam   Constitutional: SHE is oriented to person, place, and time. She appears well-developed and well-nourished. No distress.   HENT: WNL  Head: Normocephalic and atraumatic.   Eyes: Pupils are equal, round, and reactive to light. EOM are normal.   Neck: Normal range of motion. Neck supple.   Cardiovascular: Normal rate, regular rhythm, normal heart sounds and intact distal pulses.   No murmur heard.  Pulmonary/Chest: Effort normal and breath sounds normal. No respiratory distress. She has no wheezes.   Musculoskeletal: Normal range of motion. She exhibits no edema.   Neurological: She is alert and oriented to person, place, and time. No cranial nerve deficit.   Skin: Skin is warm and dry. Capillary refill takes less than 2 seconds.   Psychiatric: She has a normal mood and affect. Her behavior is normal.           Milla Delgado MD    We Offer Telehealth & Same Day Appointments!   Book your Telehealth appointment with me through my nurse or   Clinic appointments on [email or web address]!  [phone]     To Schedule appointments online, go to [employer]: [email or web address]

## 2022-02-08 NOTE — PROGRESS NOTES
Results have been reviewed via [employer]. Please verify that these have been viewed by patient. If not, please call patient with results.    I have sent a msg to patient with the following interpretation (see below):      Dear MsBishnuJuliana Ochoa       I have reviewed your recent blood work.     Your complete blood count is nml, no anemia   Your metabolic panel which shows your electrolytes, glucose, kidney function, and liver function is nml; except for mildly elevated bilirubin - we will cont to monitor with yearly labs   Thyroid function is nml  A1c is normal (labs to screen for prediabetes and diabetes)       The 10-year ASCVD risk score (Monarchger HOFFMAN Jr., et al., 2013) is: 6%. No need for cholesterol medication at this time. Cont to limit fatty foods; participate in regular exercise.     Values used to calculate the score:      Age: 61 years      Sex: Female      Is Non- : No      Diabetic: No      Tobacco smoker: No      Systolic Blood Pressure: 130 mmHg      Is BP treated: Yes      HDL Cholesterol: 60 mg/dL      Total Cholesterol: 204 mg/dL      Please do not hesitate to call or message with any additional questions or concerns    Milla Delgado MD

## 2022-03-16 ENCOUNTER — PATIENT MESSAGE (OUTPATIENT)
Dept: FAMILY MEDICINE | Facility: CLINIC | Age: 62
End: 2022-03-16
Payer: COMMERCIAL

## 2022-03-17 ENCOUNTER — TELEPHONE (OUTPATIENT)
Dept: ADMINISTRATIVE | Facility: HOSPITAL | Age: 62
End: 2022-03-17
Payer: COMMERCIAL

## 2022-03-18 ENCOUNTER — TELEPHONE (OUTPATIENT)
Dept: ADMINISTRATIVE | Facility: HOSPITAL | Age: 62
End: 2022-03-18
Payer: COMMERCIAL

## 2022-03-22 ENCOUNTER — PATIENT MESSAGE (OUTPATIENT)
Dept: FAMILY MEDICINE | Facility: CLINIC | Age: 62
End: 2022-03-22
Payer: COMMERCIAL

## 2022-04-08 DIAGNOSIS — E03.9 ACQUIRED HYPOTHYROIDISM: ICD-10-CM

## 2022-04-08 RX ORDER — LEVOTHYROXINE SODIUM 75 UG/1
75 TABLET ORAL
Qty: 90 TABLET | Refills: 0 | Status: SHIPPED | OUTPATIENT
Start: 2022-04-08 | End: 2022-05-07

## 2022-04-08 NOTE — TELEPHONE ENCOUNTER
----- Message from Clara Morales sent at 4/8/2022 10:55 AM CDT -----  Pt states that she need a refill on levothyroxine (SYNTHROID) 75 MCG tablet.        [facility] - [address]  [place] [address]  Phone: [phone] Fax: [phone]      Pt states that she would like to get refill attached to the med listed below so that she don't have to call every month for a refill.    levothyroxine (SYNTHROID) 75 MCG tablet    atenoloL (TENORMIN) 50 MG tablet      Pt can be reached at [phone] (home) [phone] (work)

## 2022-05-02 ENCOUNTER — PATIENT MESSAGE (OUTPATIENT)
Dept: ADMINISTRATIVE | Facility: HOSPITAL | Age: 62
End: 2022-05-02
Payer: COMMERCIAL

## 2022-05-24 ENCOUNTER — OFFICE VISIT (OUTPATIENT)
Dept: FAMILY MEDICINE | Facility: CLINIC | Age: 62
End: 2022-05-24
Payer: COMMERCIAL

## 2022-05-24 ENCOUNTER — LAB VISIT (OUTPATIENT)
Dept: LAB | Facility: HOSPITAL | Age: 62
End: 2022-05-24
Attending: STUDENT IN AN ORGANIZED HEALTH CARE EDUCATION/TRAINING PROGRAM
Payer: COMMERCIAL

## 2022-05-24 VITALS
TEMPERATURE: 98 F | BODY MASS INDEX: 40.35 KG/M2 | WEIGHT: 219.25 LBS | SYSTOLIC BLOOD PRESSURE: 136 MMHG | RESPIRATION RATE: 18 BRPM | HEART RATE: 70 BPM | HEIGHT: 62 IN | DIASTOLIC BLOOD PRESSURE: 85 MMHG

## 2022-05-24 DIAGNOSIS — M79.10 MYALGIA: ICD-10-CM

## 2022-05-24 DIAGNOSIS — M79.10 MYALGIA: Primary | ICD-10-CM

## 2022-05-24 DIAGNOSIS — K29.70 GASTRITIS, PRESENCE OF BLEEDING UNSPECIFIED, UNSPECIFIED CHRONICITY, UNSPECIFIED GASTRITIS TYPE: ICD-10-CM

## 2022-05-24 LAB
ALBUMIN SERPL BCP-MCNC: 4.1 G/DL (ref 3.5–5.2)
ALP SERPL-CCNC: 62 U/L (ref 55–135)
ALT SERPL W/O P-5'-P-CCNC: 22 U/L (ref 10–44)
ANION GAP SERPL CALC-SCNC: 8 MMOL/L (ref 8–16)
AST SERPL-CCNC: 18 U/L (ref 10–40)
BILIRUB SERPL-MCNC: 0.8 MG/DL (ref 0.1–1)
BUN SERPL-MCNC: 15 MG/DL (ref 8–23)
CALCIUM SERPL-MCNC: 9.3 MG/DL (ref 8.7–10.5)
CHLORIDE SERPL-SCNC: 110 MMOL/L (ref 95–110)
CK SERPL-CCNC: 81 U/L (ref 20–180)
CO2 SERPL-SCNC: 24 MMOL/L (ref 23–29)
CREAT SERPL-MCNC: 0.8 MG/DL (ref 0.5–1.4)
CREAT UR-MCNC: 174 MG/DL (ref 15–325)
CRP SERPL-MCNC: 2.7 MG/L (ref 0–8.2)
EST. GFR  (AFRICAN AMERICAN): >60 ML/MIN/1.73 M^2
EST. GFR  (NON AFRICAN AMERICAN): >60 ML/MIN/1.73 M^2
GLUCOSE SERPL-MCNC: 100 MG/DL (ref 70–110)
POTASSIUM SERPL-SCNC: 3.9 MMOL/L (ref 3.5–5.1)
PROT SERPL-MCNC: 7.5 G/DL (ref 6–8.4)
PROT UR-MCNC: 9 MG/DL (ref 0–15)
PROT/CREAT UR: 0.05 MG/G{CREAT} (ref 0–0.2)
SODIUM SERPL-SCNC: 142 MMOL/L (ref 136–145)
TSH SERPL DL<=0.005 MIU/L-ACNC: 2.09 UIU/ML (ref 0.4–4)

## 2022-05-24 PROCEDURE — 1159F PR MEDICATION LIST DOCUMENTED IN MEDICAL RECORD: ICD-10-PCS | Mod: CPTII,S$GLB,, | Performed by: STUDENT IN AN ORGANIZED HEALTH CARE EDUCATION/TRAINING PROGRAM

## 2022-05-24 PROCEDURE — 99214 PR OFFICE/OUTPT VISIT, EST, LEVL IV, 30-39 MIN: ICD-10-PCS | Mod: S$GLB,,, | Performed by: STUDENT IN AN ORGANIZED HEALTH CARE EDUCATION/TRAINING PROGRAM

## 2022-05-24 PROCEDURE — 99999 PR PBB SHADOW E&M-EST. PATIENT-LVL IV: ICD-10-PCS | Mod: PBBFAC,,, | Performed by: STUDENT IN AN ORGANIZED HEALTH CARE EDUCATION/TRAINING PROGRAM

## 2022-05-24 PROCEDURE — 86140 C-REACTIVE PROTEIN: CPT | Performed by: STUDENT IN AN ORGANIZED HEALTH CARE EDUCATION/TRAINING PROGRAM

## 2022-05-24 PROCEDURE — 99214 OFFICE O/P EST MOD 30 MIN: CPT | Mod: S$GLB,,, | Performed by: STUDENT IN AN ORGANIZED HEALTH CARE EDUCATION/TRAINING PROGRAM

## 2022-05-24 PROCEDURE — 84443 ASSAY THYROID STIM HORMONE: CPT | Performed by: STUDENT IN AN ORGANIZED HEALTH CARE EDUCATION/TRAINING PROGRAM

## 2022-05-24 PROCEDURE — 3075F SYST BP GE 130 - 139MM HG: CPT | Mod: CPTII,S$GLB,, | Performed by: STUDENT IN AN ORGANIZED HEALTH CARE EDUCATION/TRAINING PROGRAM

## 2022-05-24 PROCEDURE — 82550 ASSAY OF CK (CPK): CPT | Performed by: STUDENT IN AN ORGANIZED HEALTH CARE EDUCATION/TRAINING PROGRAM

## 2022-05-24 PROCEDURE — 1159F MED LIST DOCD IN RCRD: CPT | Mod: CPTII,S$GLB,, | Performed by: STUDENT IN AN ORGANIZED HEALTH CARE EDUCATION/TRAINING PROGRAM

## 2022-05-24 PROCEDURE — 1160F RVW MEDS BY RX/DR IN RCRD: CPT | Mod: CPTII,S$GLB,, | Performed by: STUDENT IN AN ORGANIZED HEALTH CARE EDUCATION/TRAINING PROGRAM

## 2022-05-24 PROCEDURE — 36415 COLL VENOUS BLD VENIPUNCTURE: CPT | Mod: PO | Performed by: STUDENT IN AN ORGANIZED HEALTH CARE EDUCATION/TRAINING PROGRAM

## 2022-05-24 PROCEDURE — 3044F PR MOST RECENT HEMOGLOBIN A1C LEVEL <7.0%: ICD-10-PCS | Mod: CPTII,S$GLB,, | Performed by: STUDENT IN AN ORGANIZED HEALTH CARE EDUCATION/TRAINING PROGRAM

## 2022-05-24 PROCEDURE — 1160F PR REVIEW ALL MEDS BY PRESCRIBER/CLIN PHARMACIST DOCUMENTED: ICD-10-PCS | Mod: CPTII,S$GLB,, | Performed by: STUDENT IN AN ORGANIZED HEALTH CARE EDUCATION/TRAINING PROGRAM

## 2022-05-24 PROCEDURE — 82570 ASSAY OF URINE CREATININE: CPT | Performed by: STUDENT IN AN ORGANIZED HEALTH CARE EDUCATION/TRAINING PROGRAM

## 2022-05-24 PROCEDURE — 81001 URINALYSIS AUTO W/SCOPE: CPT | Performed by: STUDENT IN AN ORGANIZED HEALTH CARE EDUCATION/TRAINING PROGRAM

## 2022-05-24 PROCEDURE — 80053 COMPREHEN METABOLIC PANEL: CPT | Performed by: STUDENT IN AN ORGANIZED HEALTH CARE EDUCATION/TRAINING PROGRAM

## 2022-05-24 PROCEDURE — 85651 RBC SED RATE NONAUTOMATED: CPT | Mod: PO | Performed by: STUDENT IN AN ORGANIZED HEALTH CARE EDUCATION/TRAINING PROGRAM

## 2022-05-24 PROCEDURE — 3075F PR MOST RECENT SYSTOLIC BLOOD PRESS GE 130-139MM HG: ICD-10-PCS | Mod: CPTII,S$GLB,, | Performed by: STUDENT IN AN ORGANIZED HEALTH CARE EDUCATION/TRAINING PROGRAM

## 2022-05-24 PROCEDURE — 3008F PR BODY MASS INDEX (BMI) DOCUMENTED: ICD-10-PCS | Mod: CPTII,S$GLB,, | Performed by: STUDENT IN AN ORGANIZED HEALTH CARE EDUCATION/TRAINING PROGRAM

## 2022-05-24 PROCEDURE — 3008F BODY MASS INDEX DOCD: CPT | Mod: CPTII,S$GLB,, | Performed by: STUDENT IN AN ORGANIZED HEALTH CARE EDUCATION/TRAINING PROGRAM

## 2022-05-24 PROCEDURE — 3079F DIAST BP 80-89 MM HG: CPT | Mod: CPTII,S$GLB,, | Performed by: STUDENT IN AN ORGANIZED HEALTH CARE EDUCATION/TRAINING PROGRAM

## 2022-05-24 PROCEDURE — 86200 CCP ANTIBODY: CPT | Performed by: STUDENT IN AN ORGANIZED HEALTH CARE EDUCATION/TRAINING PROGRAM

## 2022-05-24 PROCEDURE — 85025 COMPLETE CBC W/AUTO DIFF WBC: CPT | Performed by: STUDENT IN AN ORGANIZED HEALTH CARE EDUCATION/TRAINING PROGRAM

## 2022-05-24 PROCEDURE — 99999 PR PBB SHADOW E&M-EST. PATIENT-LVL IV: CPT | Mod: PBBFAC,,, | Performed by: STUDENT IN AN ORGANIZED HEALTH CARE EDUCATION/TRAINING PROGRAM

## 2022-05-24 PROCEDURE — 3079F PR MOST RECENT DIASTOLIC BLOOD PRESSURE 80-89 MM HG: ICD-10-PCS | Mod: CPTII,S$GLB,, | Performed by: STUDENT IN AN ORGANIZED HEALTH CARE EDUCATION/TRAINING PROGRAM

## 2022-05-24 PROCEDURE — 86803 HEPATITIS C AB TEST: CPT | Performed by: STUDENT IN AN ORGANIZED HEALTH CARE EDUCATION/TRAINING PROGRAM

## 2022-05-24 PROCEDURE — 3044F HG A1C LEVEL LT 7.0%: CPT | Mod: CPTII,S$GLB,, | Performed by: STUDENT IN AN ORGANIZED HEALTH CARE EDUCATION/TRAINING PROGRAM

## 2022-05-24 RX ORDER — MELOXICAM 7.5 MG/1
7.5 TABLET ORAL DAILY
Qty: 30 TABLET | Refills: 11 | Status: SHIPPED | OUTPATIENT
Start: 2022-05-24 | End: 2023-05-24

## 2022-05-24 RX ORDER — FAMOTIDINE 40 MG/1
40 TABLET, FILM COATED ORAL DAILY
Qty: 30 TABLET | Refills: 11 | Status: SHIPPED | OUTPATIENT
Start: 2022-05-24 | End: 2023-04-25
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
## 2022-05-25 ENCOUNTER — LAB VISIT (OUTPATIENT)
Dept: LAB | Facility: HOSPITAL | Age: 62
End: 2022-05-25
Attending: STUDENT IN AN ORGANIZED HEALTH CARE EDUCATION/TRAINING PROGRAM
Payer: COMMERCIAL

## 2022-05-25 DIAGNOSIS — M79.10 MYALGIA: ICD-10-CM

## 2022-05-25 LAB
BACTERIA #/AREA URNS AUTO: NORMAL /HPF
BASOPHILS # BLD AUTO: 0.06 K/UL (ref 0–0.2)
BASOPHILS NFR BLD: 0.7 % (ref 0–1.9)
BILIRUB UR QL STRIP: NEGATIVE
CAOX CRY UR QL COMP ASSIST: NORMAL
CCP AB SER IA-ACNC: 0.8 U/ML
CLARITY UR REFRACT.AUTO: ABNORMAL
COLOR UR AUTO: YELLOW
DIFFERENTIAL METHOD: ABNORMAL
EOSINOPHIL # BLD AUTO: 0.2 K/UL (ref 0–0.5)
EOSINOPHIL NFR BLD: 2 % (ref 0–8)
ERYTHROCYTE [DISTWIDTH] IN BLOOD BY AUTOMATED COUNT: 14.8 % (ref 11.5–14.5)
ERYTHROCYTE [SEDIMENTATION RATE] IN BLOOD BY WESTERGREN METHOD: 14 MM/HR (ref 0–20)
GLUCOSE UR QL STRIP: NEGATIVE
HCT VFR BLD AUTO: 42 % (ref 37–48.5)
HCV AB SERPL QL IA: NEGATIVE
HGB BLD-MCNC: 13.1 G/DL (ref 12–16)
HGB UR QL STRIP: ABNORMAL
IMM GRANULOCYTES # BLD AUTO: 0.03 K/UL (ref 0–0.04)
IMM GRANULOCYTES NFR BLD AUTO: 0.4 % (ref 0–0.5)
KETONES UR QL STRIP: NEGATIVE
LEUKOCYTE ESTERASE UR QL STRIP: NEGATIVE
LYMPHOCYTES # BLD AUTO: 2.3 K/UL (ref 1–4.8)
LYMPHOCYTES NFR BLD: 27.7 % (ref 18–48)
MCH RBC QN AUTO: 28.9 PG (ref 27–31)
MCHC RBC AUTO-ENTMCNC: 31.2 G/DL (ref 32–36)
MCV RBC AUTO: 93 FL (ref 82–98)
MICROSCOPIC COMMENT: NORMAL
MONOCYTES # BLD AUTO: 0.7 K/UL (ref 0.3–1)
MONOCYTES NFR BLD: 8.8 % (ref 4–15)
NEUTROPHILS # BLD AUTO: 4.9 K/UL (ref 1.8–7.7)
NEUTROPHILS NFR BLD: 60.4 % (ref 38–73)
NITRITE UR QL STRIP: NEGATIVE
NRBC BLD-RTO: 0 /100 WBC
PH UR STRIP: 5 [PH] (ref 5–8)
PLATELET # BLD AUTO: 249 K/UL (ref 150–450)
PMV BLD AUTO: 10.8 FL (ref 9.2–12.9)
PROT UR QL STRIP: NEGATIVE
RBC # BLD AUTO: 4.53 M/UL (ref 4–5.4)
RBC #/AREA URNS AUTO: 1 /HPF (ref 0–4)
SP GR UR STRIP: 1.02 (ref 1–1.03)
SQUAMOUS #/AREA URNS AUTO: 0 /HPF
URN SPEC COLLECT METH UR: ABNORMAL
WBC # BLD AUTO: 8.16 K/UL (ref 3.9–12.7)
WBC #/AREA URNS AUTO: 1 /HPF (ref 0–5)

## 2022-05-25 PROCEDURE — 86431 RHEUMATOID FACTOR QUANT: CPT | Performed by: STUDENT IN AN ORGANIZED HEALTH CARE EDUCATION/TRAINING PROGRAM

## 2022-05-25 PROCEDURE — 86235 NUCLEAR ANTIGEN ANTIBODY: CPT | Performed by: STUDENT IN AN ORGANIZED HEALTH CARE EDUCATION/TRAINING PROGRAM

## 2022-05-25 PROCEDURE — 36415 COLL VENOUS BLD VENIPUNCTURE: CPT | Mod: PO | Performed by: STUDENT IN AN ORGANIZED HEALTH CARE EDUCATION/TRAINING PROGRAM

## 2022-05-25 PROCEDURE — 86038 ANTINUCLEAR ANTIBODIES: CPT | Performed by: STUDENT IN AN ORGANIZED HEALTH CARE EDUCATION/TRAINING PROGRAM

## 2022-05-26 ENCOUNTER — PATIENT MESSAGE (OUTPATIENT)
Dept: FAMILY MEDICINE | Facility: CLINIC | Age: 62
End: 2022-05-26
Payer: COMMERCIAL

## 2022-05-26 DIAGNOSIS — R31.29 MICROHEMATURIA: Primary | ICD-10-CM

## 2022-05-26 LAB
ANA SER QL IF: NORMAL
ANTI-SSA ANTIBODY: 0.04 RATIO (ref 0–0.99)
ANTI-SSA INTERPRETATION: NEGATIVE
RHEUMATOID FACT SERPL-ACNC: <13 IU/ML (ref 0–15)

## 2022-05-26 RX ORDER — TAMSULOSIN HYDROCHLORIDE 0.4 MG/1
0.4 CAPSULE ORAL DAILY
Qty: 30 CAPSULE | Refills: 11 | Status: SHIPPED | OUTPATIENT
Start: 2022-05-26 | End: 2023-04-25

## 2022-05-26 RX ORDER — TRAMADOL HYDROCHLORIDE 50 MG/1
50 TABLET ORAL EVERY 6 HOURS PRN
Qty: 30 TABLET | Refills: 0 | Status: SHIPPED | OUTPATIENT
Start: 2022-05-26 | End: 2022-05-26

## 2022-05-26 NOTE — TELEPHONE ENCOUNTER
No orders of the defined types were placed in this encounter.      Medications Ordered This Encounter   Medications    tamsulosin (FLOMAX) 0.4 mg Cap     Sig: Take 1 capsule (0.4 mg total) by mouth once daily.     Dispense:  30 capsule     Refill:  11        your urine shows some blood and calcium, consistent with kidney stones. I have sent in flomax to help you potentially pass the suspected stone. I have also sent in tramadol for pain. We will repeat labs in about 6-12 weeks.     Please sched UA in 6 weeks    I have signed for the following orders AND/OR meds.  Please call the patient and ask the patient to schedule the testing AND/OR inform about any medications that were sent.      Orders Placed This Encounter   Procedures    Urinalysis     Standing Status:   Future     Standing Expiration Date:   5/26/2023       Medications Ordered This Encounter   Medications    tamsulosin (FLOMAX) 0.4 mg Cap     Sig: Take 1 capsule (0.4 mg total) by mouth once daily.     Dispense:  30 capsule     Refill:  11    traMADoL (ULTRAM) 50 mg tablet     Sig: Take 1 tablet (50 mg total) by mouth every 6 (six) hours as needed for Pain.     Dispense:  30 tablet     Refill:  0     Order Specific Question:   I have reviewed the Prescription Drug Monitoring Program (PDMP) database for this patient prior to prescribing the above opioid medication     Answer:   Yes

## 2022-05-26 NOTE — PROGRESS NOTES
Results have been reviewed via [employer]. Please verify that these have been viewed by patient. If not, please call patient with results.    I have sent a msg to patient with the following interpretation (see below):      Dear Ms.Juliana CARRASCO Don       I have reviewed your recent blood work.     Your Hepatitis C screening is normal (patients are recommended to be screened at least once in their lifetime)   Your labs for inflammation are nml   Your complete blood count is nml, no anemia   Your metabolic panel which shows your electrolytes, glucose, kidney function, and liver function is nml  Thyroid function is nml      Please do not hesitate to call or message with any additional questions or concerns    Milla Delgado MD

## 2022-05-27 ENCOUNTER — PATIENT MESSAGE (OUTPATIENT)
Dept: FAMILY MEDICINE | Facility: CLINIC | Age: 62
End: 2022-05-27
Payer: COMMERCIAL

## 2022-05-30 ENCOUNTER — PATIENT MESSAGE (OUTPATIENT)
Dept: FAMILY MEDICINE | Facility: CLINIC | Age: 62
End: 2022-05-30
Payer: COMMERCIAL

## 2022-06-14 ENCOUNTER — TELEPHONE (OUTPATIENT)
Dept: ADMINISTRATIVE | Facility: HOSPITAL | Age: 62
End: 2022-06-14
Payer: COMMERCIAL

## 2022-06-14 ENCOUNTER — PATIENT OUTREACH (OUTPATIENT)
Dept: ADMINISTRATIVE | Facility: HOSPITAL | Age: 62
End: 2022-06-14
Payer: COMMERCIAL

## 2022-06-14 NOTE — LETTER
AUTHORIZATION FOR RELEASE OF   CONFIDENTIAL INFORMATION      We are seeing Juliana Ochoa, date of birth 1960, in the clinic at [facility]. Milla Delgado MD is the patient's PCP. Juliana Ochoa has an outstanding lab/procedure at the time we reviewed her chart. In order to help keep her health information updated, she has authorized us to request the following medical record(s):        (  )  MAMMOGRAM                                      ( X )  COLONOSCOPY      (  )  PAP SMEAR                                          (  )  OUTSIDE LAB RESULTS     (  )  DEXA SCAN                                          (  )  EYE EXAM            (  )  FOOT EXAM                                          (  )  ENTIRE RECORD     (  )  OUTSIDE IMMUNIZATIONS                 (  )  _______________         Please fax records to [facility], Courtney Royal, MD, [phone]     If you have any questions, please contact Karoline Potter          Patient Name: Juliana Ochoa  : 1960  Patient Phone #: [phone]

## 2022-06-14 NOTE — PROGRESS NOTES
Colon requested     Contacted patient as a reminder for pap smear. Left a message to return a call

## 2022-06-15 ENCOUNTER — HOSPITAL ENCOUNTER (OUTPATIENT)
Dept: RADIOLOGY | Facility: HOSPITAL | Age: 62
Discharge: HOME | End: 2022-06-15
Attending: STUDENT IN AN ORGANIZED HEALTH CARE EDUCATION/TRAINING PROGRAM
Payer: COMMERCIAL

## 2022-06-15 VITALS — HEIGHT: 62 IN | WEIGHT: 219.38 LBS | BODY MASS INDEX: 40.37 KG/M2

## 2022-06-15 DIAGNOSIS — Z12.31 ENCOUNTER FOR SCREENING MAMMOGRAM FOR MALIGNANT NEOPLASM OF BREAST: ICD-10-CM

## 2022-06-15 PROCEDURE — 77067 MAMMO DIGITAL SCREENING BILAT WITH TOMO: ICD-10-PCS | Mod: 26,,, | Performed by: RADIOLOGY

## 2022-06-15 PROCEDURE — 77067 SCR MAMMO BI INCL CAD: CPT | Mod: TC,PO

## 2022-06-15 PROCEDURE — 77063 MAMMO DIGITAL SCREENING BILAT WITH TOMO: ICD-10-PCS | Mod: 26,,, | Performed by: RADIOLOGY

## 2022-06-15 PROCEDURE — 77067 SCR MAMMO BI INCL CAD: CPT | Mod: 26,,, | Performed by: RADIOLOGY

## 2022-06-15 PROCEDURE — 77063 BREAST TOMOSYNTHESIS BI: CPT | Mod: 26,,, | Performed by: RADIOLOGY

## 2022-06-16 ENCOUNTER — PATIENT OUTREACH (OUTPATIENT)
Dept: ADMINISTRATIVE | Facility: HOSPITAL | Age: 62
End: 2022-06-16
Payer: COMMERCIAL

## 2022-07-07 DIAGNOSIS — I10 ESSENTIAL HYPERTENSION: ICD-10-CM

## 2022-07-07 NOTE — TELEPHONE ENCOUNTER
----- Message from Cayetano Norman sent at 7/7/2022 11:37 AM CDT -----  Contact: self  Type:  RX Refill Request    Who Called: Juliana Ochoa   Refill or New Rx:Refill  RX Name and Strength:atenoloL (TENORMIN) 50 MG tablet  How is the patient currently taking it? (ex. 1XDay):1XDay  Is this a 30 day or 90 day RX:90  Preferred Pharmacy with phone number:  [facility] [place] - [address] [address]  [address]  [facility] [address]  Phone: [phone] Fax: [phone]  Local or Mail Order:Local  Ordering Provider:Hope  Would the patient rather a call back or a response via [facility]? Call back  Best Call Back Number:[phone]  Additional Information:

## 2022-07-08 RX ORDER — ATENOLOL 50 MG/1
50 TABLET ORAL DAILY
Qty: 90 TABLET | Refills: 0 | Status: SHIPPED | OUTPATIENT
Start: 2022-07-08 | End: 2022-07-12

## 2022-07-12 ENCOUNTER — OFFICE VISIT (OUTPATIENT)
Dept: FAMILY MEDICINE | Facility: CLINIC | Age: 62
End: 2022-07-12
Payer: COMMERCIAL

## 2022-07-12 VITALS
OXYGEN SATURATION: 98 % | WEIGHT: 220.38 LBS | HEART RATE: 62 BPM | SYSTOLIC BLOOD PRESSURE: 137 MMHG | TEMPERATURE: 98 F | HEIGHT: 62 IN | DIASTOLIC BLOOD PRESSURE: 77 MMHG | BODY MASS INDEX: 40.55 KG/M2 | RESPIRATION RATE: 18 BRPM

## 2022-07-12 DIAGNOSIS — E66.01 CLASS 2 SEVERE OBESITY DUE TO EXCESS CALORIES WITH SERIOUS COMORBIDITY AND BODY MASS INDEX (BMI) OF 38.0 TO 38.9 IN ADULT: ICD-10-CM

## 2022-07-12 DIAGNOSIS — R31.29 MICROSCOPIC HEMATURIA: Primary | ICD-10-CM

## 2022-07-12 DIAGNOSIS — R31.29 MICROHEMATURIA: ICD-10-CM

## 2022-07-12 PROBLEM — F41.8 DEPRESSION WITH ANXIETY: Status: RESOLVED | Noted: 2020-02-12 | Resolved: 2022-07-12

## 2022-07-12 LAB
BACTERIA #/AREA URNS HPF: ABNORMAL /HPF
BILIRUB UR QL STRIP: NEGATIVE
CLARITY UR: CLEAR
COLOR UR: YELLOW
GLUCOSE UR QL STRIP: NEGATIVE
HGB UR QL STRIP: ABNORMAL
KETONES UR QL STRIP: NEGATIVE
LEUKOCYTE ESTERASE UR QL STRIP: ABNORMAL
MICROSCOPIC COMMENT: ABNORMAL
NITRITE UR QL STRIP: NEGATIVE
PH UR STRIP: 6 [PH] (ref 5–8)
PROT UR QL STRIP: NEGATIVE
RBC #/AREA URNS HPF: 3 /HPF (ref 0–4)
SP GR UR STRIP: 1.01 (ref 1–1.03)
SQUAMOUS #/AREA URNS HPF: 1 /HPF
URN SPEC COLLECT METH UR: ABNORMAL
UROBILINOGEN UR STRIP-ACNC: NEGATIVE EU/DL
WBC #/AREA URNS HPF: 7 /HPF (ref 0–5)

## 2022-07-12 PROCEDURE — 3075F SYST BP GE 130 - 139MM HG: CPT | Mod: CPTII,S$GLB,, | Performed by: STUDENT IN AN ORGANIZED HEALTH CARE EDUCATION/TRAINING PROGRAM

## 2022-07-12 PROCEDURE — 3044F PR MOST RECENT HEMOGLOBIN A1C LEVEL <7.0%: ICD-10-PCS | Mod: CPTII,S$GLB,, | Performed by: STUDENT IN AN ORGANIZED HEALTH CARE EDUCATION/TRAINING PROGRAM

## 2022-07-12 PROCEDURE — 81000 URINALYSIS NONAUTO W/SCOPE: CPT | Mod: PO | Performed by: STUDENT IN AN ORGANIZED HEALTH CARE EDUCATION/TRAINING PROGRAM

## 2022-07-12 PROCEDURE — 1159F MED LIST DOCD IN RCRD: CPT | Mod: CPTII,S$GLB,, | Performed by: STUDENT IN AN ORGANIZED HEALTH CARE EDUCATION/TRAINING PROGRAM

## 2022-07-12 PROCEDURE — 3075F PR MOST RECENT SYSTOLIC BLOOD PRESS GE 130-139MM HG: ICD-10-PCS | Mod: CPTII,S$GLB,, | Performed by: STUDENT IN AN ORGANIZED HEALTH CARE EDUCATION/TRAINING PROGRAM

## 2022-07-12 PROCEDURE — 3078F PR MOST RECENT DIASTOLIC BLOOD PRESSURE < 80 MM HG: ICD-10-PCS | Mod: CPTII,S$GLB,, | Performed by: STUDENT IN AN ORGANIZED HEALTH CARE EDUCATION/TRAINING PROGRAM

## 2022-07-12 PROCEDURE — 99213 PR OFFICE/OUTPT VISIT, EST, LEVL III, 20-29 MIN: ICD-10-PCS | Mod: S$GLB,,, | Performed by: STUDENT IN AN ORGANIZED HEALTH CARE EDUCATION/TRAINING PROGRAM

## 2022-07-12 PROCEDURE — 3008F BODY MASS INDEX DOCD: CPT | Mod: CPTII,S$GLB,, | Performed by: STUDENT IN AN ORGANIZED HEALTH CARE EDUCATION/TRAINING PROGRAM

## 2022-07-12 PROCEDURE — 1159F PR MEDICATION LIST DOCUMENTED IN MEDICAL RECORD: ICD-10-PCS | Mod: CPTII,S$GLB,, | Performed by: STUDENT IN AN ORGANIZED HEALTH CARE EDUCATION/TRAINING PROGRAM

## 2022-07-12 PROCEDURE — 1160F PR REVIEW ALL MEDS BY PRESCRIBER/CLIN PHARMACIST DOCUMENTED: ICD-10-PCS | Mod: CPTII,S$GLB,, | Performed by: STUDENT IN AN ORGANIZED HEALTH CARE EDUCATION/TRAINING PROGRAM

## 2022-07-12 PROCEDURE — 99999 PR PBB SHADOW E&M-EST. PATIENT-LVL IV: ICD-10-PCS | Mod: PBBFAC,,, | Performed by: STUDENT IN AN ORGANIZED HEALTH CARE EDUCATION/TRAINING PROGRAM

## 2022-07-12 PROCEDURE — 3078F DIAST BP <80 MM HG: CPT | Mod: CPTII,S$GLB,, | Performed by: STUDENT IN AN ORGANIZED HEALTH CARE EDUCATION/TRAINING PROGRAM

## 2022-07-12 PROCEDURE — 1160F RVW MEDS BY RX/DR IN RCRD: CPT | Mod: CPTII,S$GLB,, | Performed by: STUDENT IN AN ORGANIZED HEALTH CARE EDUCATION/TRAINING PROGRAM

## 2022-07-12 PROCEDURE — 99999 PR PBB SHADOW E&M-EST. PATIENT-LVL IV: CPT | Mod: PBBFAC,,, | Performed by: STUDENT IN AN ORGANIZED HEALTH CARE EDUCATION/TRAINING PROGRAM

## 2022-07-12 PROCEDURE — 3044F HG A1C LEVEL LT 7.0%: CPT | Mod: CPTII,S$GLB,, | Performed by: STUDENT IN AN ORGANIZED HEALTH CARE EDUCATION/TRAINING PROGRAM

## 2022-07-12 PROCEDURE — 3008F PR BODY MASS INDEX (BMI) DOCUMENTED: ICD-10-PCS | Mod: CPTII,S$GLB,, | Performed by: STUDENT IN AN ORGANIZED HEALTH CARE EDUCATION/TRAINING PROGRAM

## 2022-07-12 PROCEDURE — 99213 OFFICE O/P EST LOW 20 MIN: CPT | Mod: S$GLB,,, | Performed by: STUDENT IN AN ORGANIZED HEALTH CARE EDUCATION/TRAINING PROGRAM

## 2022-07-12 NOTE — PROGRESS NOTES
Problem List Items Addressed This Visit        Renal/    Microhematuria    Overview     Reports Flomax has helped abd pain and low back pain. Reports urine light yellow (was previously orange).   Reports she has increased water intake.   - repeat UA               Endocrine    Class 2 severe obesity due to excess calories with serious comorbidity and body mass index (BMI) of 38.0 to 38.9 in adult    Overview     Wt Readings from Last 3 Encounters:   07/12/22 1551 100 kg (220 lb 6.4 oz)   06/15/22 0851 99.5 kg (219 lb 5.7 oz)   05/24/22 1523 99.5 kg (219 lb 4 oz)     General weight loss/lifestyle modification strategies discussed: limit sugary drinks, exercise 3-5x per week  Informal exercise measures discussed, e.g. taking stairs instead of elevator.    previously on Optivia and lost 40 lbs; however, has gained 40lb back              Other Visit Diagnoses     Microscopic hematuria    -  Primary    Relevant Orders    Urinalysis, Reflex to Urine Culture Urine, Clean Catch              Follow up in about 6 months (around 1/12/2023), or if symptoms worsen or fail to improve.    Milla Delgado MD  _________________________________________________________________________      Patient ID: Juliana Ochoa is a 61 y.o. female.    Chief Complaint:  Follow up    Reports Flomax has helped abd pain and low back pain. Reports urine light yellow (was previously orange).   Reports she has increased water intake. Reports she has been feeling better.    Denies fevers, chills, chest pain, SOB, fatigue, abdominal pain, nausea, vomiting, dysuria, hematuria, hematochezia, or melena.         Past medical histories reviewed, including past medical, surgical, family and social histories.      Current Outpatient Medications on File Prior to Visit   Medication Sig Dispense Refill    albuterol (PROAIR HFA) 90 mcg/actuation inhaler Inhale 2 puffs into the lungs every 4 (four) hours as needed for Shortness of Breath. Rescue 18 g 2    atenoloL  (TENORMIN) 50 MG tablet TAKE 1 TABLET BY MOUTH once DAILY 90 tablet 3    beclomethasone (QVAR) 80 mcg/actuation Aero Inhale 1 puff into the lungs 2 (two) times daily. Controller 120 each 0    clotrimazole-betamethasone 1-0.05% (LOTRISONE) cream Apply topically 2 (two) times daily. 1 Tube 0    famotidine (PEPCID) 40 MG tablet Take 1 tablet (40 mg total) by mouth once daily. 30 tablet 11    hydrocortisone 2.5 % cream APPLY to RASH on face when flared twice daily  0    levothyroxine (SYNTHROID) 75 MCG tablet TAKE ONE TABLET BY MOUTH EVERY MORNING before breakfast 30 tablet 0    meloxicam (MOBIC) 7.5 MG tablet Take 1 tablet (7.5 mg total) by mouth once daily. 30 tablet 11    tamsulosin (FLOMAX) 0.4 mg Cap Take 1 capsule (0.4 mg total) by mouth once daily. 30 capsule 11    traMADoL (ULTRAM) 50 mg tablet TAKE ONE TABLET BY MOUTH EVERY 6 HOURS AS NEEDED FOR PAIN 28 tablet 0    [DISCONTINUED] atenoloL (TENORMIN) 50 MG tablet Take 1 tablet (50 mg total) by mouth once daily. 90 tablet 0     No current facility-administered medications on file prior to visit.       Review of Systems   12 point review of systems negative except for listed in HPI.     Objective:    Nursing note and vitals reviewed.  Vitals:    07/12/22 1551   BP: 137/77   Pulse: 62   Resp: 18   Temp: 98.3 °F (36.8 °C)     Body mass index is 40.31 kg/m².     Physical Exam   Constitutional: oriented to person, place, and time. well-developed and well-nourished. No distress.   HENT: WNL  Head: Normocephalic and atraumatic.   Eyes: EOM are normal.   Neck: Normal range of motion. Neck supple.   Cardiovascular: Normal rate  Pulmonary/Chest: Effort normal. No respiratory distress.   Musculoskeletal: Normal range of motion. no edema.   Neurological: CN II-XII intact  Skin: warm and dry.   Psychiatric: normal mood and affect. behavior is normal.           We Offer Telehealth & Same Day Appointments!   Book your Telehealth appointment with me through my nurse or    Clinic appointments on MyChart!  [phone]     To Schedule appointments online, go to [employer]: [email or web address]

## 2022-07-13 ENCOUNTER — PATIENT MESSAGE (OUTPATIENT)
Dept: FAMILY MEDICINE | Facility: CLINIC | Age: 62
End: 2022-07-13
Payer: COMMERCIAL

## 2022-07-13 DIAGNOSIS — N30.90 CYSTITIS: Primary | ICD-10-CM

## 2022-07-13 RX ORDER — NITROFURANTOIN 25; 75 MG/1; MG/1
100 CAPSULE ORAL 2 TIMES DAILY
Qty: 10 CAPSULE | Refills: 0 | Status: SHIPPED | OUTPATIENT
Start: 2022-07-13 | End: 2022-07-18

## 2022-07-23 ENCOUNTER — NURSE TRIAGE (OUTPATIENT)
Dept: ADMINISTRATIVE | Facility: CLINIC | Age: 62
End: 2022-07-23
Payer: COMMERCIAL

## 2022-07-23 NOTE — TELEPHONE ENCOUNTER
Reason for Disposition   [1] COVID-19 infection diagnosed or suspected AND [2] mild symptoms (fever, cough) AND [3] no trouble breathing or other complications    Additional Information   Negative: Severe difficulty breathing (e.g., struggling for each breath, speaks in single words)   Negative: Difficult to awaken or acting confused (e.g., disoriented, slurred speech)   Negative: Bluish (or gray) lips or face now   Negative: Shock suspected (e.g., cold/pale/clammy skin, too weak to stand, low BP, rapid pulse)   Negative: Sounds like a life-threatening emergency to the triager   Negative: [1] COVID-19 suspected (e.g., cough, fever, shortness of breath) AND [2] mild symptoms AND [3] public health department recommends testing   Negative: [1] COVID-19 exposure AND [2] no symptoms   Negative: COVID-19 and Breastfeeding, questions about   Negative: SEVERE or constant chest pain (Exception: mild central chest pain, present only when coughing)   Negative: MODERATE difficulty breathing (e.g., speaks in phrases, SOB even at rest, pulse 100-120)   Negative: Patient sounds very sick or weak to the triager   Negative: MILD difficulty breathing (e.g., minimal/no SOB at rest, SOB with walking, pulse <100)   Negative: Chest pain   Negative: Fever > 103 F (39.4 C)   Negative: [1] Fever > 101 F (38.3 C) AND [2] age > 60   Negative: [1] Fever > 100.0 F (37.8 C) AND [2] bedridden (e.g., nursing home patient, CVA, chronic illness, recovering from surgery)   Negative: HIGH RISK patient (e.g., age > 64 years, diabetes, heart or lung disease, weak immune system)   Negative: Fever present > 3 days (72 hours)   Negative: [1] Fever returns after gone for over 24 hours AND [2] symptoms worse or not improved   Negative: [1] Continuous (nonstop) coughing interferes with work or school AND [2] no improvement using cough treatment per protocol   Negative: Cough present > 3 weeks    Protocols used: CORONAVIRUS (COVID-19) -  DIAGNOSED OR [id number]  4  Pt reports she tested Positive for COVID today via home test. sx started fri 7/22 afternoon. achy. overwt. borderline DM. no CP, no SOB. back and arm pain, chest congestion. no pain with deep breath. rec home care. Offered protocol advice. Pt declined offer to get in touch with MD on call. Call back with questions

## 2022-07-25 ENCOUNTER — OFFICE VISIT (OUTPATIENT)
Dept: FAMILY MEDICINE | Facility: CLINIC | Age: 62
End: 2022-07-25
Payer: COMMERCIAL

## 2022-07-25 DIAGNOSIS — U07.1 COVID-19: Primary | ICD-10-CM

## 2022-07-25 DIAGNOSIS — U07.1 COVID-19: ICD-10-CM

## 2022-07-25 PROBLEM — J30.9 ALLERGIC RHINITIS: Status: ACTIVE | Noted: 2022-07-25

## 2022-07-25 PROBLEM — R05.9 COUGH: Status: ACTIVE | Noted: 2022-07-25

## 2022-07-25 PROCEDURE — 3044F HG A1C LEVEL LT 7.0%: CPT | Mod: CPTII,95,, | Performed by: STUDENT IN AN ORGANIZED HEALTH CARE EDUCATION/TRAINING PROGRAM

## 2022-07-25 PROCEDURE — 1159F PR MEDICATION LIST DOCUMENTED IN MEDICAL RECORD: ICD-10-PCS | Mod: CPTII,95,, | Performed by: STUDENT IN AN ORGANIZED HEALTH CARE EDUCATION/TRAINING PROGRAM

## 2022-07-25 PROCEDURE — 99214 OFFICE O/P EST MOD 30 MIN: CPT | Mod: 95,,, | Performed by: STUDENT IN AN ORGANIZED HEALTH CARE EDUCATION/TRAINING PROGRAM

## 2022-07-25 PROCEDURE — 1160F PR REVIEW ALL MEDS BY PRESCRIBER/CLIN PHARMACIST DOCUMENTED: ICD-10-PCS | Mod: CPTII,95,, | Performed by: STUDENT IN AN ORGANIZED HEALTH CARE EDUCATION/TRAINING PROGRAM

## 2022-07-25 PROCEDURE — 3044F PR MOST RECENT HEMOGLOBIN A1C LEVEL <7.0%: ICD-10-PCS | Mod: CPTII,95,, | Performed by: STUDENT IN AN ORGANIZED HEALTH CARE EDUCATION/TRAINING PROGRAM

## 2022-07-25 PROCEDURE — 99214 PR OFFICE/OUTPT VISIT, EST, LEVL IV, 30-39 MIN: ICD-10-PCS | Mod: 95,,, | Performed by: STUDENT IN AN ORGANIZED HEALTH CARE EDUCATION/TRAINING PROGRAM

## 2022-07-25 PROCEDURE — 1160F RVW MEDS BY RX/DR IN RCRD: CPT | Mod: CPTII,95,, | Performed by: STUDENT IN AN ORGANIZED HEALTH CARE EDUCATION/TRAINING PROGRAM

## 2022-07-25 PROCEDURE — 1159F MED LIST DOCD IN RCRD: CPT | Mod: CPTII,95,, | Performed by: STUDENT IN AN ORGANIZED HEALTH CARE EDUCATION/TRAINING PROGRAM

## 2022-07-25 RX ORDER — CODEINE PHOSPHATE AND GUAIFENESIN 10; 100 MG/5ML; MG/5ML
10 SOLUTION ORAL 4 TIMES DAILY PRN
Qty: 240 ML | Refills: 0 | Status: SHIPPED | OUTPATIENT
Start: 2022-07-25 | End: 2022-08-04

## 2022-07-25 NOTE — TELEPHONE ENCOUNTER
I spoke with the patient about this. Pt does not use Mychart, audio visit scheduled with Dr. Delgado.

## 2022-07-25 NOTE — TELEPHONE ENCOUNTER
----- Message from Otilia Morales sent at 7/25/2022 10:58 AM CDT -----  Contact: Bijal/Pharmacy  Type:  Pharmacy Calling to Clarify an RX    Name of Caller: Bijal  Pharmacy Name:   [facility] - [place] - [address]  [address]  [place] [address]  Phone: [phone] Fax: [phone]  Prescription Name: Bobbi  What do they need to clarify?: It is not in the system and she wants to know if it can be change to something else  Best Call Back Number: Please call her at [phone]  Additional Information:

## 2022-07-25 NOTE — TELEPHONE ENCOUNTER
I spoke Bijal at [facility] regarding this. Bijal states the molnupiravir 200 mg capsule (EUA) is not a medication they have in stock and they have never heard of this medication. I spoke with patient about this. Pt is requesting the medication be sent to [facility] to see if the medication is in stock there. Please review and sign

## 2022-07-25 NOTE — PROGRESS NOTES
Established Patient - Audio Only Telehealth Visit     The patient location is: [place]  The chief complaint leading to consultation is: COVID+   Visit type: Virtual visit with audio only (telephone)  Total time spent with patient: 6 min       The reason for the audio only service rather than synchronous audio and video virtual visit was related to technical difficulties or patient preference/necessity.     Each patient to whom I provide medical services by telemedicine is:  (1) informed of the relationship between the physician and patient and the respective role of any other health care provider with respect to management of the patient; and (2) notified that they may decline to receive medical services by telemedicine and may withdraw from such care at any time. Patient verbally consented to receive this service via voice-only telephone call.       HPI: Reports she has tested positive for COVID on Saturday. Started feeling bad on Friday: tired, burning eyes, yellow productive cough. Reports elevated temp. Previously with chills. No nausea/vomting. Non-bloody, watery diarrhea. Tolerating PO. Exercise/allergy -induced asthma. Using prn albuterol. Denies SOB. Partially vaccinated against COVID.     No orders of the defined types were placed in this encounter.      Medications Ordered This Encounter   Medications    guaiFENesin-codeine 100-10 mg/5 ml (TUSSI-ORGANIDIN NR)  mg/5 mL syrup     Sig: Take 10 mLs by mouth 4 (four) times daily as needed for Cough.     Dispense:  240 mL     Refill:  0     Order Specific Question:   I have reviewed the Prescription Drug Monitoring Program (PDMP) database for this patient prior to prescribing the above opioid medication     Answer:   Yes    molnupiravir 200 mg capsule (EUA)     Sig: Take 4 capsules (800 mg total) by mouth every 12 (twelve) hours. for 5 days     Dispense:  40 capsule     Refill:  0     Order Specific Question:   Per EUA criteria, patient has a positive COVID  test AND symptom onset </= 5 days     Answer:   Yes     Order Specific Question:   I have informed patients with partners of childbearing potential to use a reliable method of contraception during therapy and for 3 months after the last dose of molnupiravir. I have informed them molnupiravir may cause fetal harm.     Answer:   Acknowledge                           This service was not originating from a related E/M service provided within the previous 7 days nor will  to an E/M service or procedure within the next 24 hours or my soonest available appointment.  Prevailing standard of care was able to be met in this audio-only visit.

## 2022-07-25 NOTE — PATIENT INSTRUCTIONS
COVID     Chloraseptic throat spray and cough drops    Allergy medication (for example: xyzal, benadryl, zyrtec, allegra, Claritin)      Flonase, Mucinex    Warm soup and tea    Lots of rest and fluids    - follow up in several days if symptoms not improved  - go to the nearest emergency room if you have:  Trouble breathing  Persistent pain or pressure in the chest  New confusion  Inability to wake or stay awake  Pale, gray, or blue-colored skin, lips, or nail beds, depending on skin tone      According to the CDC,   If you have tested positive for COVID:   Stay home for 5 days.  After 5 days,if you have no symptoms or your symptoms are resolving, you may leave your house.  Continue to wear a mask around others for 5 additional days.

## 2022-08-04 ENCOUNTER — PATIENT MESSAGE (OUTPATIENT)
Dept: ADMINISTRATIVE | Facility: HOSPITAL | Age: 62
End: 2022-08-04
Payer: COMMERCIAL

## 2022-10-01 DIAGNOSIS — E03.9 ACQUIRED HYPOTHYROIDISM: ICD-10-CM

## 2022-10-03 RX ORDER — LEVOTHYROXINE SODIUM 75 UG/1
TABLET ORAL
Qty: 90 TABLET | Refills: 3 | Status: SHIPPED | OUTPATIENT
Start: 2022-10-03 | End: 2023-04-25 | Stop reason: SDUPTHER

## 2022-10-24 DIAGNOSIS — R21 RASH: ICD-10-CM

## 2022-10-24 NOTE — TELEPHONE ENCOUNTER
Requested Prescriptions     Pending Prescriptions Disp Refills    clotrimazole-betamethasone 1-0.05% (LOTRISONE) cream 45 g 1     Sig: Apply topically 2 (two) times daily.

## 2022-10-24 NOTE — TELEPHONE ENCOUNTER
----- Message from Alexia Byrd sent at 10/24/2022  4:36 PM CDT -----  Contact: [phone]  Requesting an RX refill or new RX.  Is this a refill or new RX: refill  RX name and strength (copy/paste from chart):  clotrimazole-betamethasone 1-0.05% (LOTRISONE) cream  Is this a 30 day or 90 day RX: 30  Pharmacy name and phone # (copy/paste from chart):    [facility] - [place], [place] - [address] [address]  [address]  [facility] [address]  Phone: [phone] Fax: [phone]  The doctors have asked that we provide their patients with the following 2 reminders -- prescription refills can take up to 72 hours, and a friendly reminder that in the future you can use your [facility] account to request refills: yes

## 2022-10-25 RX ORDER — CLOTRIMAZOLE AND BETAMETHASONE DIPROPIONATE 10; .64 MG/G; MG/G
CREAM TOPICAL 2 TIMES DAILY
Qty: 45 G | Refills: 1 | Status: SHIPPED | OUTPATIENT
Start: 2022-10-25 | End: 2023-04-25 | Stop reason: SDUPTHER

## 2022-11-29 ENCOUNTER — OFFICE VISIT (OUTPATIENT)
Dept: FAMILY MEDICINE | Facility: CLINIC | Age: 62
End: 2022-11-29
Payer: COMMERCIAL

## 2022-11-29 VITALS
TEMPERATURE: 98 F | DIASTOLIC BLOOD PRESSURE: 80 MMHG | BODY MASS INDEX: 40.05 KG/M2 | HEIGHT: 62 IN | HEART RATE: 59 BPM | SYSTOLIC BLOOD PRESSURE: 138 MMHG | WEIGHT: 217.63 LBS

## 2022-11-29 DIAGNOSIS — B00.1 COLD SORE: Primary | ICD-10-CM

## 2022-11-29 PROCEDURE — 3075F PR MOST RECENT SYSTOLIC BLOOD PRESS GE 130-139MM HG: ICD-10-PCS | Mod: CPTII,S$GLB,, | Performed by: PHYSICIAN ASSISTANT

## 2022-11-29 PROCEDURE — 3079F PR MOST RECENT DIASTOLIC BLOOD PRESSURE 80-89 MM HG: ICD-10-PCS | Mod: CPTII,S$GLB,, | Performed by: PHYSICIAN ASSISTANT

## 2022-11-29 PROCEDURE — 1159F MED LIST DOCD IN RCRD: CPT | Mod: CPTII,S$GLB,, | Performed by: PHYSICIAN ASSISTANT

## 2022-11-29 PROCEDURE — 99999 PR PBB SHADOW E&M-EST. PATIENT-LVL V: ICD-10-PCS | Mod: PBBFAC,,, | Performed by: PHYSICIAN ASSISTANT

## 2022-11-29 PROCEDURE — 3075F SYST BP GE 130 - 139MM HG: CPT | Mod: CPTII,S$GLB,, | Performed by: PHYSICIAN ASSISTANT

## 2022-11-29 PROCEDURE — 3008F PR BODY MASS INDEX (BMI) DOCUMENTED: ICD-10-PCS | Mod: CPTII,S$GLB,, | Performed by: PHYSICIAN ASSISTANT

## 2022-11-29 PROCEDURE — 99213 PR OFFICE/OUTPT VISIT, EST, LEVL III, 20-29 MIN: ICD-10-PCS | Mod: S$GLB,,, | Performed by: PHYSICIAN ASSISTANT

## 2022-11-29 PROCEDURE — 3044F PR MOST RECENT HEMOGLOBIN A1C LEVEL <7.0%: ICD-10-PCS | Mod: CPTII,S$GLB,, | Performed by: PHYSICIAN ASSISTANT

## 2022-11-29 PROCEDURE — 1160F RVW MEDS BY RX/DR IN RCRD: CPT | Mod: CPTII,S$GLB,, | Performed by: PHYSICIAN ASSISTANT

## 2022-11-29 PROCEDURE — 3079F DIAST BP 80-89 MM HG: CPT | Mod: CPTII,S$GLB,, | Performed by: PHYSICIAN ASSISTANT

## 2022-11-29 PROCEDURE — 1159F PR MEDICATION LIST DOCUMENTED IN MEDICAL RECORD: ICD-10-PCS | Mod: CPTII,S$GLB,, | Performed by: PHYSICIAN ASSISTANT

## 2022-11-29 PROCEDURE — 99213 OFFICE O/P EST LOW 20 MIN: CPT | Mod: S$GLB,,, | Performed by: PHYSICIAN ASSISTANT

## 2022-11-29 PROCEDURE — 3044F HG A1C LEVEL LT 7.0%: CPT | Mod: CPTII,S$GLB,, | Performed by: PHYSICIAN ASSISTANT

## 2022-11-29 PROCEDURE — 99999 PR PBB SHADOW E&M-EST. PATIENT-LVL V: CPT | Mod: PBBFAC,,, | Performed by: PHYSICIAN ASSISTANT

## 2022-11-29 PROCEDURE — 1160F PR REVIEW ALL MEDS BY PRESCRIBER/CLIN PHARMACIST DOCUMENTED: ICD-10-PCS | Mod: CPTII,S$GLB,, | Performed by: PHYSICIAN ASSISTANT

## 2022-11-29 PROCEDURE — 3008F BODY MASS INDEX DOCD: CPT | Mod: CPTII,S$GLB,, | Performed by: PHYSICIAN ASSISTANT

## 2022-11-29 RX ORDER — VALACYCLOVIR HYDROCHLORIDE 1 G/1
2000 TABLET, FILM COATED ORAL EVERY 12 HOURS
Qty: 30 TABLET | Refills: 5 | Status: SHIPPED | OUTPATIENT
Start: 2022-11-29 | End: 2023-04-25

## 2022-11-29 RX ORDER — ACYCLOVIR 50 MG/G
OINTMENT TOPICAL
Qty: 30 G | Refills: 5 | Status: SHIPPED | OUTPATIENT
Start: 2022-11-29 | End: 2023-04-25

## 2022-11-29 NOTE — PROGRESS NOTES
Assessment/Plan:    Problem List Items Addressed This Visit    None  Visit Diagnoses       Cold sore    -  Primary    Relevant Medications    valACYclovir (VALTREX) 1000 MG tablet    acyclovir 5% (ZOVIRAX) 5 % ointment            Follow up if symptoms worsen or fail to improve.    Silvina Spain PA-C  _____________________________________________________________________________________________________________________________________________________    CC: cold sores     HPI: Patient is in clinic today as an established patient here for cold sore. Symptom onset was 3 days ago, but has improved since that time. She has associated pain and drainage. She reports recurrent cold sores over the past few weeks. She has tried abreva and other OTC medication with minimal improvement. No other complaints today.     Past Medical History:   Diagnosis Date    Asthma     Esophageal dilatation     GERD (gastroesophageal reflux disease)     Hypertension     Hypothyroidism     Peptic ulcer     Rosacea      Past Surgical History:   Procedure Laterality Date     SECTION      UPPER GASTROINTESTINAL ENDOSCOPY       Review of patient's allergies indicates:  No Known Allergies  Social History     Tobacco Use    Smoking status: Never    Smokeless tobacco: Never   Substance Use Topics    Alcohol use: Yes     Alcohol/week: 0.0 standard drinks     Comment: occasional    Drug use: No     Family History   Problem Relation Age of Onset    Heart disease Mother     Glaucoma Mother     Stroke Father     Coronary artery disease Father      Current Outpatient Medications on File Prior to Visit   Medication Sig Dispense Refill    albuterol (PROAIR HFA) 90 mcg/actuation inhaler Inhale 2 puffs into the lungs every 4 (four) hours as needed for Shortness of Breath. Rescue 18 g 2    atenoloL (TENORMIN) 50 MG tablet TAKE 1 TABLET BY MOUTH once DAILY 90 tablet 3    levothyroxine (SYNTHROID) 75 MCG tablet take 1 tablet by mouth BEFORE BREAKFAST 90  "tablet 3    meloxicam (MOBIC) 7.5 MG tablet Take 1 tablet (7.5 mg total) by mouth once daily. 30 tablet 11    tamsulosin (FLOMAX) 0.4 mg Cap Take 1 capsule (0.4 mg total) by mouth once daily. 30 capsule 11    beclomethasone (QVAR) 80 mcg/actuation Aero Inhale 1 puff into the lungs 2 (two) times daily. Controller (Patient not taking: Reported on 11/29/2022) 120 each 0    clotrimazole-betamethasone 1-0.05% (LOTRISONE) cream Apply topically 2 (two) times daily. (Patient not taking: Reported on 11/29/2022) 45 g 1    famotidine (PEPCID) 40 MG tablet Take 1 tablet (40 mg total) by mouth once daily. (Patient not taking: Reported on 11/29/2022) 30 tablet 11    hydrocortisone 2.5 % cream APPLY to RASH on face when flared twice daily  0    traMADoL (ULTRAM) 50 mg tablet TAKE ONE TABLET BY MOUTH EVERY 6 HOURS AS NEEDED FOR PAIN (Patient not taking: Reported on 11/29/2022) 28 tablet 0     No current facility-administered medications on file prior to visit.       Review of Systems   Constitutional:  Negative for chills, diaphoresis, fatigue and fever.   HENT:  Negative for congestion, ear pain, postnasal drip, sinus pain and sore throat.    Eyes:  Negative for pain and redness.   Respiratory:  Negative for cough, chest tightness and shortness of breath.    Cardiovascular:  Negative for chest pain and leg swelling.   Gastrointestinal:  Negative for abdominal pain, constipation, diarrhea, nausea and vomiting.   Genitourinary:  Negative for dysuria and hematuria.   Musculoskeletal:  Negative for arthralgias and joint swelling.   Skin:  Negative for rash.   Neurological:  Negative for dizziness, syncope and headaches.   Psychiatric/Behavioral:  Negative for dysphoric mood. The patient is not nervous/anxious.      Vitals:    11/29/22 1627 11/29/22 1645   BP: (!) 146/77 138/80   Pulse: (!) 59    Temp: 97.7 °F (36.5 °C)    TempSrc: Temporal    Weight: 98.7 kg (217 lb 9.5 oz)    Height: 5' 2" (1.575 m)        Wt Readings from Last 3 " Encounters:   11/29/22 98.7 kg (217 lb 9.5 oz)   07/12/22 100 kg (220 lb 6.4 oz)   06/15/22 99.5 kg (219 lb 5.7 oz)       Physical Exam  Constitutional:       General: She is not in acute distress.     Appearance: Normal appearance. She is well-developed.   HENT:      Head: Normocephalic and atraumatic.      Mouth/Throat:      Comments: +cold sore to upper lip  Eyes:      Conjunctiva/sclera: Conjunctivae normal.   Cardiovascular:      Rate and Rhythm: Normal rate and regular rhythm.      Pulses: Normal pulses.      Heart sounds: Normal heart sounds. No murmur heard.  Pulmonary:      Effort: Pulmonary effort is normal. No respiratory distress.      Breath sounds: Normal breath sounds.   Abdominal:      General: Bowel sounds are normal. There is no distension.      Palpations: Abdomen is soft.      Tenderness: There is no abdominal tenderness.   Musculoskeletal:         General: Normal range of motion.      Cervical back: Normal range of motion and neck supple.   Skin:     General: Skin is warm and dry.      Findings: No rash.   Neurological:      General: No focal deficit present.      Mental Status: She is alert and oriented to person, place, and time.   Psychiatric:         Mood and Affect: Mood normal.         Behavior: Behavior normal.     Health Maintenance   Topic Date Due    Mammogram  06/15/2024    Lipid Panel  02/07/2027    TETANUS VACCINE  06/19/2029    Hepatitis C Screening  Completed

## 2022-12-27 ENCOUNTER — PATIENT MESSAGE (OUTPATIENT)
Dept: FAMILY MEDICINE | Facility: CLINIC | Age: 62
End: 2022-12-27
Payer: COMMERCIAL

## 2022-12-27 ENCOUNTER — TELEPHONE (OUTPATIENT)
Dept: FAMILY MEDICINE | Facility: CLINIC | Age: 62
End: 2022-12-27
Payer: COMMERCIAL

## 2022-12-27 NOTE — TELEPHONE ENCOUNTER
----- Message from Epi Baker sent at 12/27/2022  3:08 PM CST -----  Contact: Juliana  Patient is calling to speak with the nurse in regards to appt. Reports bronchitis cough congestion and 100.0 fever. Would like to be seen today. Okay with virtual visit as well.Please give patient a callback at [phone].

## 2023-01-25 ENCOUNTER — PATIENT MESSAGE (OUTPATIENT)
Dept: ADMINISTRATIVE | Facility: HOSPITAL | Age: 63
End: 2023-01-25
Payer: COMMERCIAL

## 2023-04-10 DIAGNOSIS — J45.41 MODERATE PERSISTENT ASTHMA WITH ACUTE EXACERBATION: ICD-10-CM

## 2023-04-10 RX ORDER — ALBUTEROL SULFATE 90 UG/1
AEROSOL, METERED RESPIRATORY (INHALATION)
Qty: 25.5 G | Refills: 1 | Status: SHIPPED | OUTPATIENT
Start: 2023-04-10

## 2023-04-10 NOTE — TELEPHONE ENCOUNTER
Care Due:                  Date            Visit Type   Department     Provider  --------------------------------------------------------------------------------                                VIRTUAL      [facility] FAMILY  Last Visit: 07-      AUDIO ONLY   MEDICINE       Milla Delgado  Next Visit: None Scheduled  None         None Found                                                            Last  Test          Frequency    Reason                     Performed    Due Date  --------------------------------------------------------------------------------    Cr..........  12 months..  famotidine...............  05- 05-    [facility] Embedded Care Gaps. Reference number: [id number]. 4/10/2023   8:04:31 AM CDT

## 2023-04-10 NOTE — TELEPHONE ENCOUNTER
Provider Staff:  Action required for this patient     Please see care gap opportunities below in Care Due Message.    Thanks!  [facility]     Appointments      Date Provider   Last Visit   7/25/2022 Milla Delgado MD   Next Visit   Visit date not found Milla Delgado MD     Refill Decision Note   Juliana Ochoa  is requesting a refill authorization.  Brief Assessment and Rationale for Refill:  Approve     Medication Therapy Plan:         Comments:     Note composed:4:35 PM 04/10/2023

## 2023-04-19 ENCOUNTER — PATIENT MESSAGE (OUTPATIENT)
Dept: ADMINISTRATIVE | Facility: HOSPITAL | Age: 63
End: 2023-04-19
Payer: COMMERCIAL

## 2023-04-25 ENCOUNTER — OFFICE VISIT (OUTPATIENT)
Dept: FAMILY MEDICINE | Facility: CLINIC | Age: 63
End: 2023-04-25
Payer: COMMERCIAL

## 2023-04-25 VITALS
WEIGHT: 212.19 LBS | DIASTOLIC BLOOD PRESSURE: 76 MMHG | HEART RATE: 57 BPM | BODY MASS INDEX: 39.05 KG/M2 | HEIGHT: 62 IN | SYSTOLIC BLOOD PRESSURE: 142 MMHG

## 2023-04-25 DIAGNOSIS — R21 RASH: ICD-10-CM

## 2023-04-25 DIAGNOSIS — B00.1 COLD SORE: ICD-10-CM

## 2023-04-25 DIAGNOSIS — Z00.00 ANNUAL PHYSICAL EXAM: ICD-10-CM

## 2023-04-25 DIAGNOSIS — E03.9 ACQUIRED HYPOTHYROIDISM: ICD-10-CM

## 2023-04-25 DIAGNOSIS — E66.01 CLASS 2 SEVERE OBESITY DUE TO EXCESS CALORIES WITH SERIOUS COMORBIDITY AND BODY MASS INDEX (BMI) OF 38.0 TO 38.9 IN ADULT: ICD-10-CM

## 2023-04-25 DIAGNOSIS — I10 ESSENTIAL HYPERTENSION: Primary | ICD-10-CM

## 2023-04-25 PROBLEM — R05.9 COUGH: Status: RESOLVED | Noted: 2022-07-25 | Resolved: 2023-04-25

## 2023-04-25 PROCEDURE — 99999 PR PBB SHADOW E&M-EST. PATIENT-LVL III: CPT | Mod: PBBFAC,,, | Performed by: STUDENT IN AN ORGANIZED HEALTH CARE EDUCATION/TRAINING PROGRAM

## 2023-04-25 PROCEDURE — 1160F RVW MEDS BY RX/DR IN RCRD: CPT | Mod: CPTII,S$GLB,, | Performed by: STUDENT IN AN ORGANIZED HEALTH CARE EDUCATION/TRAINING PROGRAM

## 2023-04-25 PROCEDURE — 1159F PR MEDICATION LIST DOCUMENTED IN MEDICAL RECORD: ICD-10-PCS | Mod: CPTII,S$GLB,, | Performed by: STUDENT IN AN ORGANIZED HEALTH CARE EDUCATION/TRAINING PROGRAM

## 2023-04-25 PROCEDURE — 99396 PREV VISIT EST AGE 40-64: CPT | Mod: S$GLB,,, | Performed by: STUDENT IN AN ORGANIZED HEALTH CARE EDUCATION/TRAINING PROGRAM

## 2023-04-25 PROCEDURE — 3008F BODY MASS INDEX DOCD: CPT | Mod: CPTII,S$GLB,, | Performed by: STUDENT IN AN ORGANIZED HEALTH CARE EDUCATION/TRAINING PROGRAM

## 2023-04-25 PROCEDURE — 3077F PR MOST RECENT SYSTOLIC BLOOD PRESSURE >= 140 MM HG: ICD-10-PCS | Mod: CPTII,S$GLB,, | Performed by: STUDENT IN AN ORGANIZED HEALTH CARE EDUCATION/TRAINING PROGRAM

## 2023-04-25 PROCEDURE — 1160F PR REVIEW ALL MEDS BY PRESCRIBER/CLIN PHARMACIST DOCUMENTED: ICD-10-PCS | Mod: CPTII,S$GLB,, | Performed by: STUDENT IN AN ORGANIZED HEALTH CARE EDUCATION/TRAINING PROGRAM

## 2023-04-25 PROCEDURE — 99396 PR PREVENTIVE VISIT,EST,40-64: ICD-10-PCS | Mod: S$GLB,,, | Performed by: STUDENT IN AN ORGANIZED HEALTH CARE EDUCATION/TRAINING PROGRAM

## 2023-04-25 PROCEDURE — 3078F DIAST BP <80 MM HG: CPT | Mod: CPTII,S$GLB,, | Performed by: STUDENT IN AN ORGANIZED HEALTH CARE EDUCATION/TRAINING PROGRAM

## 2023-04-25 PROCEDURE — 3077F SYST BP >= 140 MM HG: CPT | Mod: CPTII,S$GLB,, | Performed by: STUDENT IN AN ORGANIZED HEALTH CARE EDUCATION/TRAINING PROGRAM

## 2023-04-25 PROCEDURE — 3008F PR BODY MASS INDEX (BMI) DOCUMENTED: ICD-10-PCS | Mod: CPTII,S$GLB,, | Performed by: STUDENT IN AN ORGANIZED HEALTH CARE EDUCATION/TRAINING PROGRAM

## 2023-04-25 PROCEDURE — 99999 PR PBB SHADOW E&M-EST. PATIENT-LVL III: ICD-10-PCS | Mod: PBBFAC,,, | Performed by: STUDENT IN AN ORGANIZED HEALTH CARE EDUCATION/TRAINING PROGRAM

## 2023-04-25 PROCEDURE — 3078F PR MOST RECENT DIASTOLIC BLOOD PRESSURE < 80 MM HG: ICD-10-PCS | Mod: CPTII,S$GLB,, | Performed by: STUDENT IN AN ORGANIZED HEALTH CARE EDUCATION/TRAINING PROGRAM

## 2023-04-25 PROCEDURE — 1159F MED LIST DOCD IN RCRD: CPT | Mod: CPTII,S$GLB,, | Performed by: STUDENT IN AN ORGANIZED HEALTH CARE EDUCATION/TRAINING PROGRAM

## 2023-04-25 RX ORDER — ACYCLOVIR 50 MG/G
OINTMENT TOPICAL
Qty: 30 G | Refills: 5 | Status: SHIPPED | OUTPATIENT
Start: 2023-04-25

## 2023-04-25 RX ORDER — ATENOLOL 50 MG/1
50 TABLET ORAL DAILY
Qty: 90 TABLET | Refills: 3 | Status: SHIPPED | OUTPATIENT
Start: 2023-04-25 | End: 2023-06-25

## 2023-04-25 RX ORDER — BACLOFEN 10 MG/1
10 TABLET ORAL
COMMUNITY
Start: 2023-02-23

## 2023-04-25 RX ORDER — CLOTRIMAZOLE AND BETAMETHASONE DIPROPIONATE 10; .64 MG/G; MG/G
CREAM TOPICAL 2 TIMES DAILY
Qty: 45 G | Refills: 1 | Status: SHIPPED | OUTPATIENT
Start: 2023-04-25

## 2023-04-25 RX ORDER — LEVOTHYROXINE SODIUM 75 UG/1
75 TABLET ORAL
Qty: 90 TABLET | Refills: 3 | Status: SHIPPED | OUTPATIENT
Start: 2023-04-25

## 2023-04-25 RX ORDER — ALBUTEROL SULFATE 90 UG/1
AEROSOL, METERED RESPIRATORY (INHALATION)
COMMUNITY
End: 2023-04-25

## 2023-04-25 RX ORDER — MONTELUKAST SODIUM 10 MG/1
TABLET ORAL
COMMUNITY

## 2023-04-25 NOTE — PROGRESS NOTES
Problem List Items Addressed This Visit          Cardiac/Vascular    Essential hypertension - Primary    Overview     Above goal today, asx   Recheck   - Current Hypertension Medications:   Hypertension Medications               atenoloL (TENORMIN) 50 MG tablet Take 1 tablet (50 mg total) by mouth once daily.   -continue lifestyle modification with low sodium diet and exercise   -discussed hypertension disease course and importance of treating high blood pressure  -patient understood and advised of risk of untreated blood pressure.  ER precautions were given   for symptoms of hypertensive urgency and emergency.           Relevant Medications    atenoloL (TENORMIN) 50 MG tablet    Other Relevant Orders    Comprehensive Metabolic Panel    Microalbumin/Creatinine Ratio, Urine    CBC Auto Differential    TSH    Lipid Panel    Hemoglobin A1C       Endocrine    Hypothyroidism    Overview     US Thyroid 2/'21    Nontoxic goiter, unspecified     TECHNIQUE:  Grayscale and color Doppler images of the thyroid gland are obtained for interpretation.     COMPARISON:  None     FINDINGS:  The right lobe of the thyroid measures 3.1 x 0.9 x 1.0 cm.  The left lobe measures 3.5 x 0.9 x 0.9 cm.  The thyroid isthmus measures 4 mm in thickness.  The thyroid parenchyma is heterogeneous.  There is a 4 x 8 x 7 mm well-circumscribed nodule in the mid right lobe of the thyroid.     Impression:     Heterogeneous thyroid parenchyma with a subcentimeter nodule in the mid right lobe which does not meet criteria for percutaneous sampling.    Chronic hx; stable on synthroid  - denies symptoms   Lab Results   Component Value Date    TSH 2.086 05/24/2022     - cont current dose as rxd, will repeat TSH            Relevant Medications    levothyroxine (SYNTHROID) 75 MCG tablet    Other Relevant Orders    TSH    Class 2 severe obesity due to excess calories with serious comorbidity and body mass index (BMI) of 38.0 to 38.9 in adult    Overview     Wt  Readings from Last 3 Encounters:   04/25/23 1042 96.3 kg (212 lb 3.2 oz)   11/29/22 1627 98.7 kg (217 lb 9.5 oz)   07/12/22 1551 100 kg (220 lb 6.4 oz)   General weight loss/lifestyle modification strategies discussed: limit sugary drinks, exercise 3-5x per week  Informal exercise measures discussed, e.g. taking stairs instead of elevator.    previously on Optivia and lost 40 lbs; however, has gained 40lb back          Relevant Orders    Comprehensive Metabolic Panel    Microalbumin/Creatinine Ratio, Urine    CBC Auto Differential    TSH    Lipid Panel    Hemoglobin A1C       Other    Annual physical exam    Overview     Complete history and physical was completed today.    Complete and thorough medication reconciliation was performed.  Discussed risks and benefits of medications.    Advised patient on orders and health maintenance.    Continue current medications listed on your summary sheet.    All questions were answered. Patient had no further concerns. Advised of diagnoses and plan. Follow up as planned or return sooner if symptoms persist or worsen.           Relevant Orders    Comprehensive Metabolic Panel    Microalbumin/Creatinine Ratio, Urine    CBC Auto Differential    TSH    Lipid Panel    Hemoglobin A1C     Other Visit Diagnoses       Rash        Relevant Medications    clotrimazole-betamethasone 1-0.05% (LOTRISONE) cream    Cold sore        Relevant Medications    acyclovir 5% (ZOVIRAX) 5 % ointment                Follow up in about 6 months (around 10/25/2023) for pap, Fasting labs today.    Milla Delgado MD  _________________________________________________________________________      Patient ID: Juliana Ochoa is a 62 y.o. female.    Chief Complaint:  Encounter for annual exam    HPI: Patient here for a comprehensive physical exam.    Otherwise, patient has been feeling well. No additional concerns. Denies nausea, vomiting, fevers, chills, abdominal pain, fatigue.     Hx and meds  reviewed      Health Maintenance Topics with due status: Not Due       Topic Last Completion Date    Colorectal Cancer Screening 2016    TETANUS VACCINE 2019    Hemoglobin A1c (Diabetic Prevention Screening) 2022    Lipid Panel 2022    Mammogram 06/15/2022        ==============================================  History reviewed.   Health Maintenance Due   Topic Date Due    Shingles Vaccine (1 of 2) Never done    COVID-19 Vaccine (2 - Pfizer series) 2021    Cervical Cancer Screening  2021    Influenza Vaccine (1) 2022       Past Medical History:  Past Medical History:   Diagnosis Date    Asthma     Esophageal dilatation     GERD (gastroesophageal reflux disease)     Hypertension     Hypothyroidism     Peptic ulcer     Rosacea      Past Surgical History:   Procedure Laterality Date     SECTION      UPPER GASTROINTESTINAL ENDOSCOPY       Review of patient's allergies indicates:  No Known Allergies  Current Outpatient Medications on File Prior to Visit   Medication Sig Dispense Refill    albuterol (PROVENTIL/VENTOLIN HFA) 90 mcg/actuation inhaler INHALE TWO PUFFS INTO THE LUNGS EVERY 4 HOURS AS NEEDED for SHORTNESS OF BREATH 25.5 g 1    baclofen (LIORESAL) 10 MG tablet Take 10 mg by mouth.      meloxicam (MOBIC) 7.5 MG tablet Take 1 tablet (7.5 mg total) by mouth once daily. 30 tablet 11    montelukast (SINGULAIR) 10 mg tablet 1 tablet in the evening Orally Once a day      [DISCONTINUED] atenoloL (TENORMIN) 50 MG tablet TAKE 1 TABLET BY MOUTH once DAILY 90 tablet 3    [DISCONTINUED] levothyroxine (SYNTHROID) 75 MCG tablet take 1 tablet by mouth BEFORE BREAKFAST 90 tablet 3    hydrocortisone 2.5 % cream APPLY to RASH on face when flared twice daily  0    [DISCONTINUED] acyclovir 5% (ZOVIRAX) 5 % ointment Apply topically 5 (five) times daily. (Patient not taking: Reported on 2023) 30 g 5    [DISCONTINUED] albuterol (PROVENTIL/VENTOLIN HFA) 90 mcg/actuation inhaler 2  puffs as needed Inhalation every 4 hrs      [DISCONTINUED] beclomethasone (QVAR) 80 mcg/actuation Aero Inhale 1 puff into the lungs 2 (two) times daily. Controller (Patient not taking: Reported on 11/29/2022) 120 each 0    [DISCONTINUED] clotrimazole-betamethasone 1-0.05% (LOTRISONE) cream Apply topically 2 (two) times daily. (Patient not taking: Reported on 11/29/2022) 45 g 1    [DISCONTINUED] famotidine (PEPCID) 40 MG tablet Take 1 tablet (40 mg total) by mouth once daily. (Patient not taking: Reported on 11/29/2022) 30 tablet 11    [DISCONTINUED] tamsulosin (FLOMAX) 0.4 mg Cap Take 1 capsule (0.4 mg total) by mouth once daily. (Patient not taking: Reported on 4/25/2023) 30 capsule 11    [DISCONTINUED] traMADoL (ULTRAM) 50 mg tablet TAKE ONE TABLET BY MOUTH EVERY 6 HOURS AS NEEDED FOR PAIN (Patient not taking: Reported on 11/29/2022) 28 tablet 0    [DISCONTINUED] valACYclovir (VALTREX) 1000 MG tablet Take 2 tablets (2,000 mg total) by mouth every 12 (twelve) hours. For one day (Patient not taking: Reported on 4/25/2023) 30 tablet 5     No current facility-administered medications on file prior to visit.     Social History     Socioeconomic History    Marital status: Single   Tobacco Use    Smoking status: Never    Smokeless tobacco: Never   Substance and Sexual Activity    Alcohol use: Yes     Alcohol/week: 0.0 standard drinks     Comment: occasional    Drug use: No     Family History   Problem Relation Age of Onset    Heart disease Mother     Glaucoma Mother     Stroke Father     Coronary artery disease Father           Review of Systems   Constitutional: Negative for chills, fatigue, fever and unexpected weight change.   HENT: Negative for ear pain and sore throat.    Eyes: Negative for redness and visual disturbance.   Respiratory: Negative for cough and shortness of breath.    Cardiovascular: Negative for chest pain and palpitations.   Gastrointestinal: Negative for nausea and vomiting.   Musculoskeletal:  Negative for arthralgias and myalgias.   Skin: Negative for rash and wound.   Neurological: Negative for weakness and headaches.         Objective:    Nursing note and vitals reviewed.  Vitals:    04/25/23 1046   BP: (!) 142/76   Pulse: (!) 57     Body mass index is 38.81 kg/m².     Physical Exam   Constitutional: oriented to person, place, and time. well-developed and well-nourished. No distress.   HENT: WNL  Head: Normocephalic and atraumatic.   Eyes: Pupils are equal, round, and reactive to light. EOM are normal.   Neck: Normal range of motion. Neck supple.   Cardiovascular: Normal rate, regular rhythm, normal heart sounds and intact distal pulses. No murmur heard.  Pulmonary/Chest: Effort normal and breath sounds normal. No respiratory distress. no wheezes.   GI: soft, no ttp, non-distended   Musculoskeletal: Normal range of motion. no edema.   Neurological: CN II-XII intact  Skin: warm and dry. Capillary refill takes less than 2 seconds.   Psychiatric: normal mood and affect. behavior is normal.           We Offer Telehealth & Same Day Appointments!   Book your Telehealth appointment with me through my nurse or   Clinic appointments on [facility]!  Coy[facility]q-[phone]     To Schedule appointments online, go to [facility]: [email or web address]

## 2023-04-28 ENCOUNTER — PATIENT OUTREACH (OUTPATIENT)
Dept: ADMINISTRATIVE | Facility: HOSPITAL | Age: 63
End: 2023-04-28
Payer: COMMERCIAL

## 2023-04-28 NOTE — PROGRESS NOTES
[facility] Pap report: Per chart review, patient has an appointment scheduled with PCP on 10/10/23 for pap.

## 2023-06-12 ENCOUNTER — NURSE TRIAGE (OUTPATIENT)
Dept: ADMINISTRATIVE | Facility: CLINIC | Age: 63
End: 2023-06-12

## 2023-06-13 ENCOUNTER — HOSPITAL ENCOUNTER (OUTPATIENT)
Dept: RADIOLOGY | Facility: HOSPITAL | Age: 63
Discharge: HOME | End: 2023-06-13
Attending: STUDENT IN AN ORGANIZED HEALTH CARE EDUCATION/TRAINING PROGRAM
Payer: COMMERCIAL

## 2023-06-13 ENCOUNTER — OFFICE VISIT (OUTPATIENT)
Dept: FAMILY MEDICINE | Facility: CLINIC | Age: 63
End: 2023-06-13
Payer: COMMERCIAL

## 2023-06-13 VITALS
DIASTOLIC BLOOD PRESSURE: 69 MMHG | HEIGHT: 62 IN | TEMPERATURE: 97 F | SYSTOLIC BLOOD PRESSURE: 115 MMHG | WEIGHT: 212 LBS | HEART RATE: 60 BPM | BODY MASS INDEX: 39.01 KG/M2 | OXYGEN SATURATION: 95 %

## 2023-06-13 DIAGNOSIS — I51.7 CARDIOMEGALY: ICD-10-CM

## 2023-06-13 DIAGNOSIS — R07.89 CHEST WALL PAIN: ICD-10-CM

## 2023-06-13 DIAGNOSIS — R07.89 CHEST WALL PAIN: Primary | ICD-10-CM

## 2023-06-13 PROCEDURE — 1159F MED LIST DOCD IN RCRD: CPT | Mod: CPTII,S$GLB,, | Performed by: STUDENT IN AN ORGANIZED HEALTH CARE EDUCATION/TRAINING PROGRAM

## 2023-06-13 PROCEDURE — 3074F PR MOST RECENT SYSTOLIC BLOOD PRESSURE < 130 MM HG: ICD-10-PCS | Mod: CPTII,S$GLB,, | Performed by: STUDENT IN AN ORGANIZED HEALTH CARE EDUCATION/TRAINING PROGRAM

## 2023-06-13 PROCEDURE — 1160F RVW MEDS BY RX/DR IN RCRD: CPT | Mod: CPTII,S$GLB,, | Performed by: STUDENT IN AN ORGANIZED HEALTH CARE EDUCATION/TRAINING PROGRAM

## 2023-06-13 PROCEDURE — 71100 X-RAY EXAM RIBS UNI 2 VIEWS: CPT | Mod: 26,LT,, | Performed by: RADIOLOGY

## 2023-06-13 PROCEDURE — 1160F PR REVIEW ALL MEDS BY PRESCRIBER/CLIN PHARMACIST DOCUMENTED: ICD-10-PCS | Mod: CPTII,S$GLB,, | Performed by: STUDENT IN AN ORGANIZED HEALTH CARE EDUCATION/TRAINING PROGRAM

## 2023-06-13 PROCEDURE — 3074F SYST BP LT 130 MM HG: CPT | Mod: CPTII,S$GLB,, | Performed by: STUDENT IN AN ORGANIZED HEALTH CARE EDUCATION/TRAINING PROGRAM

## 2023-06-13 PROCEDURE — 3008F BODY MASS INDEX DOCD: CPT | Mod: CPTII,S$GLB,, | Performed by: STUDENT IN AN ORGANIZED HEALTH CARE EDUCATION/TRAINING PROGRAM

## 2023-06-13 PROCEDURE — 99999 PR PBB SHADOW E&M-EST. PATIENT-LVL IV: CPT | Mod: PBBFAC,,, | Performed by: STUDENT IN AN ORGANIZED HEALTH CARE EDUCATION/TRAINING PROGRAM

## 2023-06-13 PROCEDURE — 3078F PR MOST RECENT DIASTOLIC BLOOD PRESSURE < 80 MM HG: ICD-10-PCS | Mod: CPTII,S$GLB,, | Performed by: STUDENT IN AN ORGANIZED HEALTH CARE EDUCATION/TRAINING PROGRAM

## 2023-06-13 PROCEDURE — 99213 OFFICE O/P EST LOW 20 MIN: CPT | Mod: S$GLB,,, | Performed by: STUDENT IN AN ORGANIZED HEALTH CARE EDUCATION/TRAINING PROGRAM

## 2023-06-13 PROCEDURE — 71100 XR RIBS 2 VIEW LEFT: ICD-10-PCS | Mod: 26,LT,, | Performed by: RADIOLOGY

## 2023-06-13 PROCEDURE — 99213 PR OFFICE/OUTPT VISIT, EST, LEVL III, 20-29 MIN: ICD-10-PCS | Mod: S$GLB,,, | Performed by: STUDENT IN AN ORGANIZED HEALTH CARE EDUCATION/TRAINING PROGRAM

## 2023-06-13 PROCEDURE — 1159F PR MEDICATION LIST DOCUMENTED IN MEDICAL RECORD: ICD-10-PCS | Mod: CPTII,S$GLB,, | Performed by: STUDENT IN AN ORGANIZED HEALTH CARE EDUCATION/TRAINING PROGRAM

## 2023-06-13 PROCEDURE — 99999 PR PBB SHADOW E&M-EST. PATIENT-LVL IV: ICD-10-PCS | Mod: PBBFAC,,, | Performed by: STUDENT IN AN ORGANIZED HEALTH CARE EDUCATION/TRAINING PROGRAM

## 2023-06-13 PROCEDURE — 3078F DIAST BP <80 MM HG: CPT | Mod: CPTII,S$GLB,, | Performed by: STUDENT IN AN ORGANIZED HEALTH CARE EDUCATION/TRAINING PROGRAM

## 2023-06-13 PROCEDURE — 3008F PR BODY MASS INDEX (BMI) DOCUMENTED: ICD-10-PCS | Mod: CPTII,S$GLB,, | Performed by: STUDENT IN AN ORGANIZED HEALTH CARE EDUCATION/TRAINING PROGRAM

## 2023-06-13 PROCEDURE — 71100 X-RAY EXAM RIBS UNI 2 VIEWS: CPT | Mod: TC,PO,LT

## 2023-06-13 RX ORDER — MELOXICAM 15 MG/1
15 TABLET ORAL
COMMUNITY
Start: 2023-02-23

## 2023-06-13 RX ORDER — TRAMADOL HYDROCHLORIDE 50 MG/1
50 TABLET ORAL EVERY 6 HOURS PRN
Qty: 30 TABLET | Refills: 0 | Status: SHIPPED | OUTPATIENT
Start: 2023-06-13 | End: 2023-06-23

## 2023-06-13 NOTE — PROGRESS NOTES
2-3 week ECHO (currently with rib pain, so let's wait few weeks)    I have sent a msg to patient with the following interpretation (see below):    CXR - No acute rib abnormality appreciated.    Cardiomegaly (enlarged heart) suspected. We will get an ECHO/heart US to further eval. My team will get this scheduled.    Continue treatment plan as discussed in clinic.     Please do not hesitate to call or message with any questions or concerns    Milla Delgado MD

## 2023-06-13 NOTE — PROGRESS NOTES
Problem List Items Addressed This Visit          Orthopedic    Chest wall pain - Primary    Overview     Possible rib fx/bruising in setting of hitting on washing machine (6/13/23)    - will send for XR L rib  - prn voltaren gel and msk relaxer  - prn tylenol, heat/ice therapy, stretches   - prn tramadol, deep breaths   - ED precautions discussed with pt. Pt voiced understanding   Prn rtc          Relevant Medications    traMADoL (ULTRAM) 50 mg tablet    Other Relevant Orders    X-Ray Ribs 2 View Left (Completed)     Other Visit Diagnoses       Cardiomegaly        Relevant Orders    Echo                Follow up if symptoms worsen or fail to improve.    Milla Delgado MD  _________________________________________________________________________      Patient ID: Juliana Ochoa is a 62 y.o. female.    Chief Complaint: L rib pain    Reports last night reaching into washing machine and lost her footing. This caused her to hit L ribs onto machine. Reports her a loud crunching sound followed by pain to L ribs. Reports meloxicam and ice with some relief. Pleuritic chest pain with deep breaths.    Denies fevers, chills, fatigue, abdominal pain, nausea, vomiting. Denies previous hx rib fractures.        Current Outpatient Medications on File Prior to Visit   Medication Sig Dispense Refill    acyclovir 5% (ZOVIRAX) 5 % ointment Apply topically 5 (five) times daily. 30 g 5    albuterol (PROVENTIL/VENTOLIN HFA) 90 mcg/actuation inhaler INHALE TWO PUFFS INTO THE LUNGS EVERY 4 HOURS AS NEEDED for SHORTNESS OF BREATH 25.5 g 1    atenoloL (TENORMIN) 50 MG tablet Take 1 tablet (50 mg total) by mouth once daily. 90 tablet 3    clotrimazole-betamethasone 1-0.05% (LOTRISONE) cream Apply topically 2 (two) times daily. 45 g 1    hydrocortisone 2.5 % cream APPLY to RASH on face when flared twice daily  0    levothyroxine (SYNTHROID) 75 MCG tablet Take 1 tablet (75 mcg total) by mouth before breakfast. 90 tablet 3    meloxicam (MOBIC) 15  MG tablet Take 15 mg by mouth.      montelukast (SINGULAIR) 10 mg tablet 1 tablet in the evening Orally Once a day      baclofen (LIORESAL) 10 MG tablet Take 10 mg by mouth.       No current facility-administered medications on file prior to visit.       Review of Systems   12 point review of systems negative except for listed in HPI.     Objective:    Nursing note and vitals reviewed.  Vitals:    06/13/23 0755   BP: 115/69   Pulse: 60   Temp: 97 °F (36.1 °C)     Body mass index is 38.78 kg/m².     Physical Exam   Constitutional: oriented to person, place, and time. well-developed and well-nourished. No distress.   HENT: WNL  Head: Normocephalic and atraumatic.   Eyes: EOM are normal.   Neck: Normal range of motion. Neck supple.   Cardiovascular: Normal rate  Pulmonary/Chest: Effort normal. No respiratory distress. CTAB   Musculoskeletal: Normal range of motion.  L chest wall: no edema, erythema, or ecchymosis, +ttp to 7-9th rib region. No crepitus  Neurological: CN II-XII intact  Skin: warm and dry.   Psychiatric: normal mood and affect. behavior is normal.           We Offer Telehealth & Same Day Appointments!   Book your Telehealth appointment with me through my nurse or   Clinic appointments on [employer]!  Pafpdq-[phone]     To Schedule appointments online, go to [facility]: [email or web address]

## 2023-06-13 NOTE — TELEPHONE ENCOUNTER
Pt reports was trying to reach down into her washing machine, heard a 'crunch,' now having pain under her left breast, worse when she breaths in. Pt advised to go to the ED now per protocol, Pt encouraged to call back with any worsening symptoms or questions and verbalized understanding.    Reason for Disposition   [1] Can't take a deep breath BUT [2] no respiratory distress    Additional Information   Negative: Major injury from dangerous force or speed (e.g., MVA, fall > 10 feet or 3 meters)   Negative: Bullet wound, knife wound, or other penetrating object   Negative: Puncture wound that sounds life-threatening to the triager   Negative: SEVERE difficulty breathing (e.g., struggling for each breath, speaks in single words)   Negative: [1] Major bleeding (e.g., actively dripping or spurting) AND [2] can't be stopped   Negative: Open wound of the chest with sound of moving air (sucking wound) or visible air bubbles   Negative: Shock suspected (e.g., cold/pale/clammy skin, too weak to stand, low BP, rapid pulse)   Negative: Coughing or spitting up blood   Negative: Bluish (or gray) lips or face now   Negative: Unconscious or was unconscious   Negative: Sounds like a life-threatening emergency to the triager   Negative: SEVERE chest pain   Negative: [1] Difficulty breathing AND [2] not severe   Negative: Skin is split open or gaping   Negative: [1] Bleeding AND [2] won't stop after 10 minutes of direct pressure (using correct technique)   Negative: Sounds like a serious injury to the triager    Protocols used: Chest Injury-A-

## 2023-06-13 NOTE — PATIENT INSTRUCTIONS
Tylenol 1000 mg every 8 hrs as needed for pain    Or     Meloxicam daily as needed for pain      Or     Tramadol 50mg every 6-8hr as needed for pain    Ice packs and DEEP BREATHS     Send me a msg if pain is uncontrolled

## 2023-06-15 ENCOUNTER — HOSPITAL ENCOUNTER (OUTPATIENT)
Dept: RADIOLOGY | Facility: HOSPITAL | Age: 63
Discharge: HOME | End: 2023-06-15
Attending: PHYSICIAN ASSISTANT
Payer: COMMERCIAL

## 2023-06-15 ENCOUNTER — OFFICE VISIT (OUTPATIENT)
Dept: FAMILY MEDICINE | Facility: CLINIC | Age: 63
End: 2023-06-15
Payer: COMMERCIAL

## 2023-06-15 ENCOUNTER — TELEPHONE (OUTPATIENT)
Dept: FAMILY MEDICINE | Facility: CLINIC | Age: 63
End: 2023-06-15

## 2023-06-15 VITALS
OXYGEN SATURATION: 99 % | DIASTOLIC BLOOD PRESSURE: 76 MMHG | BODY MASS INDEX: 39.29 KG/M2 | HEIGHT: 62 IN | WEIGHT: 213.5 LBS | SYSTOLIC BLOOD PRESSURE: 128 MMHG | HEART RATE: 55 BPM | TEMPERATURE: 98 F | RESPIRATION RATE: 18 BRPM

## 2023-06-15 DIAGNOSIS — R05.9 COUGH, UNSPECIFIED TYPE: Primary | ICD-10-CM

## 2023-06-15 DIAGNOSIS — R05.9 COUGH, UNSPECIFIED TYPE: ICD-10-CM

## 2023-06-15 PROCEDURE — 3074F SYST BP LT 130 MM HG: CPT | Mod: CPTII,S$GLB,, | Performed by: PHYSICIAN ASSISTANT

## 2023-06-15 PROCEDURE — 71046 X-RAY EXAM CHEST 2 VIEWS: CPT | Mod: TC,PO

## 2023-06-15 PROCEDURE — 3078F DIAST BP <80 MM HG: CPT | Mod: CPTII,S$GLB,, | Performed by: PHYSICIAN ASSISTANT

## 2023-06-15 PROCEDURE — 71046 X-RAY EXAM CHEST 2 VIEWS: CPT | Mod: 26,,, | Performed by: RADIOLOGY

## 2023-06-15 PROCEDURE — 3008F PR BODY MASS INDEX (BMI) DOCUMENTED: ICD-10-PCS | Mod: CPTII,S$GLB,, | Performed by: PHYSICIAN ASSISTANT

## 2023-06-15 PROCEDURE — 71046 XR CHEST PA AND LATERAL: ICD-10-PCS | Mod: 26,,, | Performed by: RADIOLOGY

## 2023-06-15 PROCEDURE — 1160F PR REVIEW ALL MEDS BY PRESCRIBER/CLIN PHARMACIST DOCUMENTED: ICD-10-PCS | Mod: CPTII,S$GLB,, | Performed by: PHYSICIAN ASSISTANT

## 2023-06-15 PROCEDURE — 3008F BODY MASS INDEX DOCD: CPT | Mod: CPTII,S$GLB,, | Performed by: PHYSICIAN ASSISTANT

## 2023-06-15 PROCEDURE — 99999 PR PBB SHADOW E&M-EST. PATIENT-LVL V: CPT | Mod: PBBFAC,,, | Performed by: PHYSICIAN ASSISTANT

## 2023-06-15 PROCEDURE — 1160F RVW MEDS BY RX/DR IN RCRD: CPT | Mod: CPTII,S$GLB,, | Performed by: PHYSICIAN ASSISTANT

## 2023-06-15 PROCEDURE — 1159F PR MEDICATION LIST DOCUMENTED IN MEDICAL RECORD: ICD-10-PCS | Mod: CPTII,S$GLB,, | Performed by: PHYSICIAN ASSISTANT

## 2023-06-15 PROCEDURE — 99999 PR PBB SHADOW E&M-EST. PATIENT-LVL V: ICD-10-PCS | Mod: PBBFAC,,, | Performed by: PHYSICIAN ASSISTANT

## 2023-06-15 PROCEDURE — 3078F PR MOST RECENT DIASTOLIC BLOOD PRESSURE < 80 MM HG: ICD-10-PCS | Mod: CPTII,S$GLB,, | Performed by: PHYSICIAN ASSISTANT

## 2023-06-15 PROCEDURE — 3074F PR MOST RECENT SYSTOLIC BLOOD PRESSURE < 130 MM HG: ICD-10-PCS | Mod: CPTII,S$GLB,, | Performed by: PHYSICIAN ASSISTANT

## 2023-06-15 PROCEDURE — 99213 PR OFFICE/OUTPT VISIT, EST, LEVL III, 20-29 MIN: ICD-10-PCS | Mod: S$GLB,,, | Performed by: PHYSICIAN ASSISTANT

## 2023-06-15 PROCEDURE — 99213 OFFICE O/P EST LOW 20 MIN: CPT | Mod: S$GLB,,, | Performed by: PHYSICIAN ASSISTANT

## 2023-06-15 PROCEDURE — 1159F MED LIST DOCD IN RCRD: CPT | Mod: CPTII,S$GLB,, | Performed by: PHYSICIAN ASSISTANT

## 2023-06-15 NOTE — PROGRESS NOTES
Assessment/Plan:    Problem List Items Addressed This Visit    None  Visit Diagnoses       Cough, unspecified type    -  Primary    Relevant Orders    X-Ray Chest PA And Lateral        -cough >1 day; hx of rib injury  -will obtain CXR today  -supportive care- rest, increase hydration with water, OTC Tylenol/Ibuprofen for pain/fever  -follow up if no improvement in symptoms   -ER precautions for severe or worsening of symptoms     Follow up if symptoms worsen or fail to improve.    Silvina Sapin PA-C  _____________________________________________________________________________________________________________________________________________________    CC: cough    HPI: Patient is in clinic today as an established patient here for cough. This is a new problem. The current episode started 1 day ago. The problem is gradually worsening. The cough is productive of green/yellow sputum. Associated symptoms include none. Pertinent negatives include no fever, chills, diaphoresis, ear pain, headaches, hoarse voice, neck pain, shortness of breath, sneezing, sore throat or swollen glands. Past treatments include none. She has had no known sick contacts. Patient was seen in the clinic on  for L rib injury in which she had XR which showed no acute fractures. She stated that the pain is improving, however, she is concerned for pneumonia. No other complaints today.    Past Medical History:   Diagnosis Date    Asthma     Esophageal dilatation     GERD (gastroesophageal reflux disease)     Hypertension     Hypothyroidism     Peptic ulcer     Rosacea      Past Surgical History:   Procedure Laterality Date     SECTION      UPPER GASTROINTESTINAL ENDOSCOPY       Review of patient's allergies indicates:  No Known Allergies  Social History     Tobacco Use    Smoking status: Never    Smokeless tobacco: Never   Substance Use Topics    Alcohol use: Yes     Alcohol/week: 0.0 standard drinks     Comment: occasional    Drug use:  No     Family History   Problem Relation Age of Onset    Heart disease Mother     Glaucoma Mother     Stroke Father     Coronary artery disease Father      Current Outpatient Medications on File Prior to Visit   Medication Sig Dispense Refill    acyclovir 5% (ZOVIRAX) 5 % ointment Apply topically 5 (five) times daily. 30 g 5    albuterol (PROVENTIL/VENTOLIN HFA) 90 mcg/actuation inhaler INHALE TWO PUFFS INTO THE LUNGS EVERY 4 HOURS AS NEEDED for SHORTNESS OF BREATH 25.5 g 1    atenoloL (TENORMIN) 50 MG tablet Take 1 tablet (50 mg total) by mouth once daily. 90 tablet 3    baclofen (LIORESAL) 10 MG tablet Take 10 mg by mouth.      clotrimazole-betamethasone 1-0.05% (LOTRISONE) cream Apply topically 2 (two) times daily. 45 g 1    hydrocortisone 2.5 % cream APPLY to RASH on face when flared twice daily  0    levothyroxine (SYNTHROID) 75 MCG tablet Take 1 tablet (75 mcg total) by mouth before breakfast. 90 tablet 3    meloxicam (MOBIC) 15 MG tablet Take 15 mg by mouth.      montelukast (SINGULAIR) 10 mg tablet 1 tablet in the evening Orally Once a day      traMADoL (ULTRAM) 50 mg tablet Take 1 tablet (50 mg total) by mouth every 6 (six) hours as needed for Pain. 30 tablet 0     No current facility-administered medications on file prior to visit.       Review of Systems   Constitutional:  Negative for chills, diaphoresis, fatigue and fever.   HENT:  Negative for congestion, ear pain, postnasal drip, sinus pain and sore throat.    Eyes:  Negative for pain and redness.   Respiratory:  Positive for cough. Negative for chest tightness and shortness of breath.    Cardiovascular:  Negative for chest pain and leg swelling.   Gastrointestinal:  Negative for abdominal pain, constipation, diarrhea, nausea and vomiting.   Genitourinary:  Negative for dysuria and hematuria.   Musculoskeletal:  Negative for arthralgias and joint swelling.   Skin:  Negative for rash.   Neurological:  Negative for dizziness, syncope and headaches.  "  Psychiatric/Behavioral:  Negative for dysphoric mood. The patient is not nervous/anxious.      Vitals:    06/15/23 1414   BP: 128/76   Pulse: (!) 55   Resp: 18   Temp: 98 °F (36.7 °C)   SpO2: 99%   Weight: 96.8 kg (213 lb 8 oz)   Height: 5' 2" (1.575 m)       Wt Readings from Last 3 Encounters:   06/15/23 96.8 kg (213 lb 8 oz)   06/13/23 96.2 kg (212 lb)   04/25/23 96.3 kg (212 lb 3.2 oz)       Physical Exam  Constitutional:       General: She is not in acute distress.     Appearance: Normal appearance. She is well-developed.   HENT:      Head: Normocephalic and atraumatic.      Right Ear: Tympanic membrane normal.      Left Ear: Tympanic membrane normal.      Nose: Nose normal.      Mouth/Throat:      Mouth: Mucous membranes are moist.      Pharynx: Oropharynx is clear.   Eyes:      Conjunctiva/sclera: Conjunctivae normal.   Cardiovascular:      Rate and Rhythm: Normal rate and regular rhythm.      Pulses: Normal pulses.      Heart sounds: Normal heart sounds. No murmur heard.  Pulmonary:      Effort: Pulmonary effort is normal. No respiratory distress.      Breath sounds: Normal breath sounds. No wheezing.   Abdominal:      General: Bowel sounds are normal. There is no distension.      Palpations: Abdomen is soft.      Tenderness: There is no abdominal tenderness.   Musculoskeletal:         General: Normal range of motion.      Cervical back: Normal range of motion and neck supple.   Skin:     General: Skin is warm and dry.      Findings: No rash.   Neurological:      General: No focal deficit present.      Mental Status: She is alert and oriented to person, place, and time.   Psychiatric:         Mood and Affect: Mood normal.         Behavior: Behavior normal.       Health Maintenance   Topic Date Due    Mammogram  06/15/2024    Lipid Panel  02/07/2027    TETANUS VACCINE  06/19/2029    Hepatitis C Screening  Completed     "

## 2023-06-15 NOTE — TELEPHONE ENCOUNTER
----- Message from Phill Abreu sent at 6/15/2023  9:04 AM CDT -----  Contact: [phone]  Pt would like to consult with a nurse in regards to some concerns. Pt stated that she coughed up yellow stuff this morning and is concerned she may be coming done with pneumonia . Please call pt back at [phone]. Thanks KB

## 2023-06-15 NOTE — PROGRESS NOTES
Results have been released via [employer]. Please verify that these have been viewed by patient. If not, please call patient with results.     I have sent a message to them with the following interpretation (see below).    I have reviewed your recent CXR which was normal.      Please do not hesitate to call or message with any additional questions or concerns.    Silvina Spain PA-C

## 2023-06-15 NOTE — TELEPHONE ENCOUNTER
I spoke with the patient about this. Pt scheduled with Silvina Spain this afternoon for evaluation.

## 2023-06-21 ENCOUNTER — TELEPHONE (OUTPATIENT)
Dept: CARDIOLOGY | Facility: HOSPITAL | Age: 63
End: 2023-06-21

## 2023-06-22 ENCOUNTER — PATIENT MESSAGE (OUTPATIENT)
Dept: FAMILY MEDICINE | Facility: CLINIC | Age: 63
End: 2023-06-22

## 2023-06-22 DIAGNOSIS — R19.02 LEFT UPPER QUADRANT ABDOMINAL MASS: Primary | ICD-10-CM

## 2023-06-22 NOTE — TELEPHONE ENCOUNTER
We can further assess w/ soft tissue US. Please schedule imaging.    I have signed for the following orders AND/OR meds.  Please call the patient and ask the patient to schedule the testing AND/OR inform about any medications that were sent. Medications have been sent to pharmacy listed below      Orders Placed This Encounter   Procedures    US Soft Tissue Abdomen     Standing Status:   Future     Standing Expiration Date:   6/22/2024     Order Specific Question:   May the Radiologist modify the order per protocol to meet the clinical needs of the patient?     Answer:   Yes     Order Specific Question:   Release to patient     Answer:   Immediate              [facility] - [place] - [id number]  [address]  [place] [address]  Phone: [phone] Fax: [phone]    [facility] #[id number] - [place] - [address] [place] [address]  Phone: [phone] Fax: [phone]

## 2023-06-23 ENCOUNTER — TELEPHONE (OUTPATIENT)
Dept: FAMILY MEDICINE | Facility: CLINIC | Age: 63
End: 2023-06-23

## 2023-06-23 NOTE — TELEPHONE ENCOUNTER
----- Message from Michelle Cleaning sent at 6/23/2023  8:56 AM CDT -----  Contact: pt  Type:  Patient Returning Call    Who Called: pt  Who Left Message for Patient: nurse  Does the patient know what this is regarding?:   Would the patient rather a call back or a response via [facility]? phone  Best Call Back Number: [phone]  Additional Information:

## 2023-06-25 DIAGNOSIS — I10 ESSENTIAL HYPERTENSION: ICD-10-CM

## 2023-06-25 RX ORDER — ATENOLOL 50 MG/1
TABLET ORAL
Qty: 90 TABLET | Refills: 3 | Status: SHIPPED | OUTPATIENT
Start: 2023-06-25

## 2023-06-25 NOTE — TELEPHONE ENCOUNTER
Provider Staff:  Action required for this patient     Please see care gap opportunities below in Care Due Message.    Thanks!  [facility]     Appointments      Date Provider   Last Visit   6/13/2023 Milla Delgado MD   Next Visit   10/10/2023 Milla Delgado MD     Refill Decision Note   Juliana Ochoa  is requesting a refill authorization.  Brief Assessment and Rationale for Refill:  Approve     Medication Therapy Plan:         Comments:     Note composed:11:39 AM 06/25/2023

## 2023-06-25 NOTE — TELEPHONE ENCOUNTER
Care Due:                  Date            Visit Type   Department     Provider  --------------------------------------------------------------------------------                                EP -                              PRIMARY      [facility] FAMILY  Last Visit: 06-      CARE ([facility])   SURI Delgado                              [facility] FAMILY  Next Visit: 10-      CARE ([facility])   SURI BROWN [facility]  Test          Frequency    Reason                     Performed    Due Date  --------------------------------------------------------------------------------    TSH.........  12 months..  levothyroxine............  05- 05-    Health [facility] Embedded Care Due Messages. Reference number: [id number].   6/25/2023 11:36:33 AM CDT

## 2023-06-26 ENCOUNTER — PATIENT MESSAGE (OUTPATIENT)
Dept: FAMILY MEDICINE | Facility: CLINIC | Age: 63
End: 2023-06-26

## 2023-06-27 ENCOUNTER — HOSPITAL ENCOUNTER (OUTPATIENT)
Dept: RADIOLOGY | Facility: HOSPITAL | Age: 63
Discharge: HOME | End: 2023-06-27
Attending: PHYSICIAN ASSISTANT
Payer: COMMERCIAL

## 2023-06-27 DIAGNOSIS — R19.02 LEFT UPPER QUADRANT ABDOMINAL MASS: ICD-10-CM

## 2023-06-27 PROCEDURE — 76705 US SOFT TISSUE, ABDOMEN: ICD-10-PCS | Mod: 26,,, | Performed by: RADIOLOGY

## 2023-06-27 PROCEDURE — 76705 ECHO EXAM OF ABDOMEN: CPT | Mod: 26,,, | Performed by: RADIOLOGY

## 2023-06-27 PROCEDURE — 76705 ECHO EXAM OF ABDOMEN: CPT | Mod: TC,PO

## 2023-06-27 NOTE — PROGRESS NOTES
Results have been released via [employer]. Please verify that these have been viewed by patient. If not, please call patient with results.     I have sent a message to them with the following interpretation (see below).    I have reviewed your recent soft tissue US which was normal. There was no evidence of a mass or hernia.    Please do not hesitate to call or message with any additional questions or concerns.    Silvina Spain PA-C

## 2023-07-31 PROBLEM — Z00.00 ANNUAL PHYSICAL EXAM: Status: RESOLVED | Noted: 2020-12-16 | Resolved: 2023-07-31

## 2023-08-02 ENCOUNTER — OFFICE VISIT (OUTPATIENT)
Dept: FAMILY MEDICINE | Facility: CLINIC | Age: 63
End: 2023-08-02
Payer: COMMERCIAL

## 2023-08-02 VITALS
TEMPERATURE: 98 F | SYSTOLIC BLOOD PRESSURE: 139 MMHG | DIASTOLIC BLOOD PRESSURE: 73 MMHG | BODY MASS INDEX: 39.77 KG/M2 | RESPIRATION RATE: 18 BRPM | WEIGHT: 216.13 LBS | HEIGHT: 62 IN

## 2023-08-02 DIAGNOSIS — H10.32 ACUTE BACTERIAL CONJUNCTIVITIS OF LEFT EYE: Primary | ICD-10-CM

## 2023-08-02 PROCEDURE — 3008F BODY MASS INDEX DOCD: CPT | Mod: CPTII,S$GLB,, | Performed by: NURSE PRACTITIONER

## 2023-08-02 PROCEDURE — 99999 PR PBB SHADOW E&M-EST. PATIENT-LVL IV: ICD-10-PCS | Mod: PBBFAC,,, | Performed by: NURSE PRACTITIONER

## 2023-08-02 PROCEDURE — 3075F PR MOST RECENT SYSTOLIC BLOOD PRESS GE 130-139MM HG: ICD-10-PCS | Mod: CPTII,S$GLB,, | Performed by: NURSE PRACTITIONER

## 2023-08-02 PROCEDURE — 99213 OFFICE O/P EST LOW 20 MIN: CPT | Mod: S$GLB,,, | Performed by: NURSE PRACTITIONER

## 2023-08-02 PROCEDURE — 3078F PR MOST RECENT DIASTOLIC BLOOD PRESSURE < 80 MM HG: ICD-10-PCS | Mod: CPTII,S$GLB,, | Performed by: NURSE PRACTITIONER

## 2023-08-02 PROCEDURE — 99999 PR PBB SHADOW E&M-EST. PATIENT-LVL IV: CPT | Mod: PBBFAC,,, | Performed by: NURSE PRACTITIONER

## 2023-08-02 PROCEDURE — 1159F PR MEDICATION LIST DOCUMENTED IN MEDICAL RECORD: ICD-10-PCS | Mod: CPTII,S$GLB,, | Performed by: NURSE PRACTITIONER

## 2023-08-02 PROCEDURE — 3008F PR BODY MASS INDEX (BMI) DOCUMENTED: ICD-10-PCS | Mod: CPTII,S$GLB,, | Performed by: NURSE PRACTITIONER

## 2023-08-02 PROCEDURE — 1159F MED LIST DOCD IN RCRD: CPT | Mod: CPTII,S$GLB,, | Performed by: NURSE PRACTITIONER

## 2023-08-02 PROCEDURE — 3078F DIAST BP <80 MM HG: CPT | Mod: CPTII,S$GLB,, | Performed by: NURSE PRACTITIONER

## 2023-08-02 PROCEDURE — 3075F SYST BP GE 130 - 139MM HG: CPT | Mod: CPTII,S$GLB,, | Performed by: NURSE PRACTITIONER

## 2023-08-02 PROCEDURE — 99213 PR OFFICE/OUTPT VISIT, EST, LEVL III, 20-29 MIN: ICD-10-PCS | Mod: S$GLB,,, | Performed by: NURSE PRACTITIONER

## 2023-08-02 RX ORDER — ERYTHROMYCIN 5 MG/G
OINTMENT OPHTHALMIC EVERY 6 HOURS
Qty: 3.5 G | Refills: 0 | Status: SHIPPED | OUTPATIENT
Start: 2023-08-02

## 2023-08-02 RX ORDER — CIPROFLOXACIN HYDROCHLORIDE 3 MG/ML
1 SOLUTION/ DROPS OPHTHALMIC EVERY 4 HOURS
Qty: 10 ML | Refills: 0 | Status: SHIPPED | OUTPATIENT
Start: 2023-08-02

## 2023-08-02 NOTE — PROGRESS NOTES
"Assessment/Plan:  Problem List Items Addressed This Visit    None  Visit Diagnoses       Acute bacterial conjunctivitis of left eye    -  Primary    Relevant Medications    ciprofloxacin HCl (CILOXAN) 0.3 % ophthalmic solution    erythromycin (ROMYCIN) ophthalmic ointment          Recommend starting ophthalmic ABX. She is requesting to have eye drops during the day and would prefer ointment at night.   Recommend cool compress; consider cleaning inner to outer corner with gentle baby soap   Avoid eye makeup   Avoid itching/touching  If worsening or no improvement follow up with ophthalmology for proper eye exam  Follow up if symptoms worsen or fail to improve.  ER precautions for severe or worsening symptoms.     Aspen Morales, MYNOR  _____________________________________________________________________________________________________________________________________________________    CC: eye pain     HPI: Patient is a 62-year-old female who presents in clinic today as an established patient here for eye pain. Located to left eye. Onset x5 days ago. She states she has the sensation of foreign body. Reports it "feels like sand" is in her eye. There has been no recent injuries or known trauma. Associated with drainage, tearing, redness. She does not wear contacts. She has had cataract surgery 2-3 years ago. She does reports visual disturbances or "floaters" but states this is ongoing since her cataract surgery and is unchanged. She has tried OTC eye drops with no relief. She is following with opthalmology, Dr. Lewis. There is no itching or photophobia.     Past Medical History:  Past Medical History:   Diagnosis Date    Asthma     Esophageal dilatation     GERD (gastroesophageal reflux disease)     Hypertension     Hypothyroidism     Peptic ulcer     Rosacea      Past Surgical History:   Procedure Laterality Date     SECTION      UPPER GASTROINTESTINAL ENDOSCOPY       Review of patient's allergies indicates:  No " Known Allergies  Social History     Tobacco Use    Smoking status: Never    Smokeless tobacco: Never   Substance Use Topics    Alcohol use: Yes     Alcohol/week: 0.0 standard drinks of alcohol     Comment: occasional    Drug use: No     Family History   Problem Relation Age of Onset    Heart disease Mother     Glaucoma Mother     Stroke Father     Coronary artery disease Father      Current Outpatient Medications on File Prior to Visit   Medication Sig Dispense Refill    acyclovir 5% (ZOVIRAX) 5 % ointment Apply topically 5 (five) times daily. 30 g 5    albuterol (PROVENTIL/VENTOLIN HFA) 90 mcg/actuation inhaler INHALE TWO PUFFS INTO THE LUNGS EVERY 4 HOURS AS NEEDED for SHORTNESS OF BREATH 25.5 g 1    atenoloL (TENORMIN) 50 MG tablet TAKE 1 TABLET BY MOUTH once DAILY 90 tablet 3    baclofen (LIORESAL) 10 MG tablet Take 10 mg by mouth.      clotrimazole-betamethasone 1-0.05% (LOTRISONE) cream Apply topically 2 (two) times daily. 45 g 1    hydrocortisone 2.5 % cream APPLY to RASH on face when flared twice daily  0    levothyroxine (SYNTHROID) 75 MCG tablet Take 1 tablet (75 mcg total) by mouth before breakfast. 90 tablet 3    meloxicam (MOBIC) 15 MG tablet Take 15 mg by mouth.      montelukast (SINGULAIR) 10 mg tablet 1 tablet in the evening Orally Once a day       No current facility-administered medications on file prior to visit.     Review of Systems   Constitutional:  Negative for activity change, chills, fatigue and fever.   HENT:  Negative for congestion, rhinorrhea and sore throat.    Eyes:  Positive for pain, discharge, redness and visual disturbance. Negative for photophobia and itching.   Respiratory:  Negative for shortness of breath.    Cardiovascular:  Negative for chest pain.   Gastrointestinal:  Negative for abdominal pain, diarrhea and vomiting.   Musculoskeletal:  Negative for arthralgias and myalgias.   Neurological:  Negative for dizziness and headaches.   Psychiatric/Behavioral:  Negative for  "behavioral problems. The patient is not nervous/anxious.      Vitals:    08/02/23 1441   BP: 139/73   Resp: 18   Temp: 97.9 °F (36.6 °C)   Weight: 98 kg (216 lb 1.6 oz)   Height: 5' 2" (1.575 m)     Wt Readings from Last 3 Encounters:   08/02/23 98 kg (216 lb 1.6 oz)   06/15/23 96.8 kg (213 lb 8 oz)   06/13/23 96.2 kg (212 lb)     Physical Exam  Vitals reviewed.   Constitutional:       General: She is not in acute distress.     Appearance: Normal appearance. She is not ill-appearing.   HENT:      Head: Normocephalic and atraumatic.      Right Ear: External ear normal.      Left Ear: External ear normal.      Nose: Nose normal.   Eyes:      General: Lids are normal.         Right eye: No foreign body, discharge or hordeolum.         Left eye: No foreign body, discharge or hordeolum.      Extraocular Movements: Extraocular movements intact.      Comments: Left sclera erythematous. No drainage noted. No obvious foreign body- limited eye exam. No swelling noted.    Cardiovascular:      Rate and Rhythm: Normal rate.      Heart sounds: Normal heart sounds.   Pulmonary:      Effort: Pulmonary effort is normal. No respiratory distress.      Breath sounds: Normal breath sounds.   Abdominal:      General: Abdomen is flat. There is no distension.   Musculoskeletal:         General: Normal range of motion.      Cervical back: Normal range of motion.   Skin:     General: Skin is warm and dry.      Capillary Refill: Capillary refill takes less than 2 seconds.      Coloration: Skin is not pale.      Findings: No rash.   Neurological:      General: No focal deficit present.      Mental Status: She is alert and oriented to person, place, and time. Mental status is at baseline.   Psychiatric:         Mood and Affect: Mood normal.         Speech: Speech normal. Speech is not rapid and pressured, delayed or slurred.         Behavior: Behavior normal. Behavior is not agitated, slowed, aggressive, withdrawn, hyperactive or combative. " Behavior is cooperative.         Thought Content: Thought content normal.         Judgment: Judgment normal.       Health Maintenance   Topic Date Due    Mammogram  06/15/2024    Lipid Panel  02/07/2027    TETANUS VACCINE  06/19/2029    Hepatitis C Screening  Completed

## 2023-12-08 ENCOUNTER — TELEPHONE (OUTPATIENT)
Dept: FAMILY MEDICINE | Facility: CLINIC | Age: 63
End: 2023-12-08

## 2023-12-08 NOTE — TELEPHONE ENCOUNTER
----- Message from Poly Orvira sent at 12/8/2023  9:59 AM CST -----  Contact: Pt  [phone]  Would like to receive medical advice.    Symptoms (please be specific):  pain at bottom of stomach, fever and excessive urination    How long has the patient had these symptoms:  Tuesday    Pharmacy name/number (copy/paste from chart):      [facility] [place] - [address] [address] [address]  [place] [address]  Phone: [phone] Fax: [phone]    Would they like a call back or a response via [facility]:  call back     Additional information:  Pt thinks she might have a UTI.  She is asking if a antibiotic can be called in.

## 2024-01-31 DIAGNOSIS — Z78.0 MENOPAUSE: ICD-10-CM

## 2024-02-29 NOTE — HIM RECORD RETIREMENT NOTE
MCC of Incomplete Medical Record    2/29/24    Patient Name: Juliana Ochoa  Contact Serial # (CSN): [id number]  Patient Medical Record # (MRN): [id number]  Date of Service: Office Visit on 3/4/2020  Physician Name: Noman Mills,    This record has been reviewed and is being retired as incomplete by the approval of the  Medical Staff Operating Committee (MSOC)     On 9/7/2022., due to:  Unavailability of Provider     Missing Information/Comments:  []    Discharge Summary   []    DC Note/Short Stay Summary   []    ED Provider Note   []    Delivery Note   []    History & Physical   []   Operative Note   []     Procedure Note   []     Physician Order   [x]     Verbal Order   []       Other, specify:

## 2024-05-22 ENCOUNTER — LAB VISIT (OUTPATIENT)
Dept: LAB | Facility: HOSPITAL | Age: 64
End: 2024-05-22
Attending: STUDENT IN AN ORGANIZED HEALTH CARE EDUCATION/TRAINING PROGRAM
Payer: COMMERCIAL

## 2024-05-22 ENCOUNTER — OFFICE VISIT (OUTPATIENT)
Dept: FAMILY MEDICINE | Facility: CLINIC | Age: 64
End: 2024-05-22
Payer: COMMERCIAL

## 2024-05-22 VITALS
TEMPERATURE: 97 F | OXYGEN SATURATION: 98 % | HEART RATE: 79 BPM | HEIGHT: 62 IN | SYSTOLIC BLOOD PRESSURE: 132 MMHG | WEIGHT: 227.5 LBS | BODY MASS INDEX: 41.86 KG/M2 | RESPIRATION RATE: 18 BRPM | DIASTOLIC BLOOD PRESSURE: 87 MMHG

## 2024-05-22 DIAGNOSIS — R53.83 FATIGUE, UNSPECIFIED TYPE: ICD-10-CM

## 2024-05-22 DIAGNOSIS — Z00.00 ANNUAL PHYSICAL EXAM: ICD-10-CM

## 2024-05-22 DIAGNOSIS — E66.01 CLASS 3 SEVERE OBESITY DUE TO EXCESS CALORIES WITH SERIOUS COMORBIDITY AND BODY MASS INDEX (BMI) OF 40.0 TO 44.9 IN ADULT: ICD-10-CM

## 2024-05-22 DIAGNOSIS — B00.1 COLD SORE: ICD-10-CM

## 2024-05-22 DIAGNOSIS — I10 ESSENTIAL HYPERTENSION: ICD-10-CM

## 2024-05-22 DIAGNOSIS — Z23 NEED FOR VACCINATION FOR STREP PNEUMONIAE: ICD-10-CM

## 2024-05-22 DIAGNOSIS — Z12.31 ENCOUNTER FOR SCREENING MAMMOGRAM FOR MALIGNANT NEOPLASM OF BREAST: Primary | ICD-10-CM

## 2024-05-22 DIAGNOSIS — E03.9 ACQUIRED HYPOTHYROIDISM: ICD-10-CM

## 2024-05-22 DIAGNOSIS — J30.1 SEASONAL ALLERGIC RHINITIS DUE TO POLLEN: ICD-10-CM

## 2024-05-22 DIAGNOSIS — E66.01 CLASS 2 SEVERE OBESITY DUE TO EXCESS CALORIES WITH SERIOUS COMORBIDITY AND BODY MASS INDEX (BMI) OF 38.0 TO 38.9 IN ADULT: ICD-10-CM

## 2024-05-22 DIAGNOSIS — J45.20 MILD INTERMITTENT ASTHMA WITHOUT COMPLICATION: ICD-10-CM

## 2024-05-22 PROBLEM — R07.89 CHEST WALL PAIN: Status: RESOLVED | Noted: 2023-06-13 | Resolved: 2024-05-22

## 2024-05-22 PROBLEM — N23 RENAL COLIC: Status: RESOLVED | Noted: 2020-03-08 | Resolved: 2024-05-22

## 2024-05-22 PROBLEM — R31.29 MICROHEMATURIA: Status: RESOLVED | Noted: 2020-03-08 | Resolved: 2024-05-22

## 2024-05-22 PROBLEM — E66.813 CLASS 3 SEVERE OBESITY DUE TO EXCESS CALORIES WITH SERIOUS COMORBIDITY AND BODY MASS INDEX (BMI) OF 40.0 TO 44.9 IN ADULT: Status: ACTIVE | Noted: 2020-02-12

## 2024-05-22 LAB
ALBUMIN SERPL BCP-MCNC: 4 G/DL (ref 3.5–5.2)
ALP SERPL-CCNC: 61 U/L (ref 55–135)
ALT SERPL W/O P-5'-P-CCNC: 29 U/L (ref 10–44)
ANION GAP SERPL CALC-SCNC: 9 MMOL/L (ref 8–16)
AST SERPL-CCNC: 18 U/L (ref 10–40)
BILIRUB SERPL-MCNC: 1.1 MG/DL (ref 0.1–1)
BUN SERPL-MCNC: 13 MG/DL (ref 8–23)
CALCIUM SERPL-MCNC: 9.3 MG/DL (ref 8.7–10.5)
CHLORIDE SERPL-SCNC: 108 MMOL/L (ref 95–110)
CHOLEST SERPL-MCNC: 203 MG/DL (ref 120–199)
CHOLEST/HDLC SERPL: 3.6 {RATIO} (ref 2–5)
CO2 SERPL-SCNC: 24 MMOL/L (ref 23–29)
CORTIS SERPL-MCNC: 10.4 UG/DL (ref 4.3–22.4)
CREAT SERPL-MCNC: 0.8 MG/DL (ref 0.5–1.4)
ERYTHROCYTE [DISTWIDTH] IN BLOOD BY AUTOMATED COUNT: 14.6 % (ref 11.5–14.5)
EST. GFR  (NO RACE VARIABLE): >60 ML/MIN/1.73 M^2
ESTIMATED AVG GLUCOSE: 120 MG/DL (ref 68–131)
FSH SERPL-ACNC: 41.49 MIU/ML
GLUCOSE SERPL-MCNC: 98 MG/DL (ref 70–110)
HBA1C MFR BLD: 5.8 % (ref 4–5.6)
HCT VFR BLD AUTO: 43.7 % (ref 37–48.5)
HDLC SERPL-MCNC: 56 MG/DL (ref 40–75)
HDLC SERPL: 27.6 % (ref 20–50)
HGB BLD-MCNC: 14 G/DL (ref 12–16)
INSULIN COLLECTION INTERVAL: NORMAL
INSULIN SERPL-ACNC: 14.9 UU/ML
LDLC SERPL CALC-MCNC: 130.2 MG/DL (ref 63–159)
LH SERPL-ACNC: 13.4 MIU/ML
MCH RBC QN AUTO: 29.6 PG (ref 27–31)
MCHC RBC AUTO-ENTMCNC: 32 G/DL (ref 32–36)
MCV RBC AUTO: 92 FL (ref 82–98)
NONHDLC SERPL-MCNC: 147 MG/DL
PLATELET # BLD AUTO: 251 K/UL (ref 150–450)
PMV BLD AUTO: 10.7 FL (ref 9.2–12.9)
POTASSIUM SERPL-SCNC: 4.1 MMOL/L (ref 3.5–5.1)
PROLACTIN SERPL IA-MCNC: 5.9 NG/ML (ref 5.2–26.5)
PROT SERPL-MCNC: 7.1 G/DL (ref 6–8.4)
RBC # BLD AUTO: 4.73 M/UL (ref 4–5.4)
SODIUM SERPL-SCNC: 141 MMOL/L (ref 136–145)
TESTOST SERPL-MCNC: 24 NG/DL (ref 5–73)
TRIGL SERPL-MCNC: 84 MG/DL (ref 30–150)
TSH SERPL DL<=0.005 MIU/L-ACNC: 2.42 UIU/ML (ref 0.4–4)
WBC # BLD AUTO: 6.24 K/UL (ref 3.9–12.7)

## 2024-05-22 PROCEDURE — 36415 COLL VENOUS BLD VENIPUNCTURE: CPT | Mod: PO | Performed by: STUDENT IN AN ORGANIZED HEALTH CARE EDUCATION/TRAINING PROGRAM

## 2024-05-22 PROCEDURE — 1159F MED LIST DOCD IN RCRD: CPT | Mod: CPTII,S$GLB,, | Performed by: STUDENT IN AN ORGANIZED HEALTH CARE EDUCATION/TRAINING PROGRAM

## 2024-05-22 PROCEDURE — 84146 ASSAY OF PROLACTIN: CPT | Performed by: STUDENT IN AN ORGANIZED HEALTH CARE EDUCATION/TRAINING PROGRAM

## 2024-05-22 PROCEDURE — 82533 TOTAL CORTISOL: CPT | Performed by: STUDENT IN AN ORGANIZED HEALTH CARE EDUCATION/TRAINING PROGRAM

## 2024-05-22 PROCEDURE — 90677 PCV20 VACCINE IM: CPT | Mod: S$GLB,,, | Performed by: STUDENT IN AN ORGANIZED HEALTH CARE EDUCATION/TRAINING PROGRAM

## 2024-05-22 PROCEDURE — 3079F DIAST BP 80-89 MM HG: CPT | Mod: CPTII,S$GLB,, | Performed by: STUDENT IN AN ORGANIZED HEALTH CARE EDUCATION/TRAINING PROGRAM

## 2024-05-22 PROCEDURE — 85027 COMPLETE CBC AUTOMATED: CPT | Performed by: STUDENT IN AN ORGANIZED HEALTH CARE EDUCATION/TRAINING PROGRAM

## 2024-05-22 PROCEDURE — 90471 IMMUNIZATION ADMIN: CPT | Mod: S$GLB,,, | Performed by: STUDENT IN AN ORGANIZED HEALTH CARE EDUCATION/TRAINING PROGRAM

## 2024-05-22 PROCEDURE — 99999 PR PBB SHADOW E&M-EST. PATIENT-LVL V: CPT | Mod: PBBFAC,,, | Performed by: STUDENT IN AN ORGANIZED HEALTH CARE EDUCATION/TRAINING PROGRAM

## 2024-05-22 PROCEDURE — 99397 PER PM REEVAL EST PAT 65+ YR: CPT | Mod: 25,S$GLB,, | Performed by: STUDENT IN AN ORGANIZED HEALTH CARE EDUCATION/TRAINING PROGRAM

## 2024-05-22 PROCEDURE — 83525 ASSAY OF INSULIN: CPT | Performed by: STUDENT IN AN ORGANIZED HEALTH CARE EDUCATION/TRAINING PROGRAM

## 2024-05-22 PROCEDURE — 1101F PT FALLS ASSESS-DOCD LE1/YR: CPT | Mod: CPTII,S$GLB,, | Performed by: STUDENT IN AN ORGANIZED HEALTH CARE EDUCATION/TRAINING PROGRAM

## 2024-05-22 PROCEDURE — 83036 HEMOGLOBIN GLYCOSYLATED A1C: CPT | Performed by: STUDENT IN AN ORGANIZED HEALTH CARE EDUCATION/TRAINING PROGRAM

## 2024-05-22 PROCEDURE — 80053 COMPREHEN METABOLIC PANEL: CPT | Performed by: STUDENT IN AN ORGANIZED HEALTH CARE EDUCATION/TRAINING PROGRAM

## 2024-05-22 PROCEDURE — 84443 ASSAY THYROID STIM HORMONE: CPT | Performed by: STUDENT IN AN ORGANIZED HEALTH CARE EDUCATION/TRAINING PROGRAM

## 2024-05-22 PROCEDURE — 80061 LIPID PANEL: CPT | Performed by: STUDENT IN AN ORGANIZED HEALTH CARE EDUCATION/TRAINING PROGRAM

## 2024-05-22 PROCEDURE — 83498 ASY HYDROXYPROGESTERONE 17-D: CPT | Performed by: STUDENT IN AN ORGANIZED HEALTH CARE EDUCATION/TRAINING PROGRAM

## 2024-05-22 PROCEDURE — 3008F BODY MASS INDEX DOCD: CPT | Mod: CPTII,S$GLB,, | Performed by: STUDENT IN AN ORGANIZED HEALTH CARE EDUCATION/TRAINING PROGRAM

## 2024-05-22 PROCEDURE — 83001 ASSAY OF GONADOTROPIN (FSH): CPT | Performed by: STUDENT IN AN ORGANIZED HEALTH CARE EDUCATION/TRAINING PROGRAM

## 2024-05-22 PROCEDURE — 84403 ASSAY OF TOTAL TESTOSTERONE: CPT | Performed by: STUDENT IN AN ORGANIZED HEALTH CARE EDUCATION/TRAINING PROGRAM

## 2024-05-22 PROCEDURE — 82626 DEHYDROEPIANDROSTERONE: CPT | Performed by: STUDENT IN AN ORGANIZED HEALTH CARE EDUCATION/TRAINING PROGRAM

## 2024-05-22 PROCEDURE — 3288F FALL RISK ASSESSMENT DOCD: CPT | Mod: CPTII,S$GLB,, | Performed by: STUDENT IN AN ORGANIZED HEALTH CARE EDUCATION/TRAINING PROGRAM

## 2024-05-22 PROCEDURE — 1126F AMNT PAIN NOTED NONE PRSNT: CPT | Mod: CPTII,S$GLB,, | Performed by: STUDENT IN AN ORGANIZED HEALTH CARE EDUCATION/TRAINING PROGRAM

## 2024-05-22 PROCEDURE — 83002 ASSAY OF GONADOTROPIN (LH): CPT | Performed by: STUDENT IN AN ORGANIZED HEALTH CARE EDUCATION/TRAINING PROGRAM

## 2024-05-22 PROCEDURE — 3075F SYST BP GE 130 - 139MM HG: CPT | Mod: CPTII,S$GLB,, | Performed by: STUDENT IN AN ORGANIZED HEALTH CARE EDUCATION/TRAINING PROGRAM

## 2024-05-22 RX ORDER — LEVOTHYROXINE SODIUM 75 UG/1
75 TABLET ORAL
Qty: 90 TABLET | Refills: 3 | Status: SHIPPED | OUTPATIENT
Start: 2024-05-22

## 2024-05-22 RX ORDER — BACLOFEN 10 MG/1
10 TABLET ORAL 2 TIMES DAILY PRN
Start: 2024-05-22

## 2024-05-22 RX ORDER — ACYCLOVIR 50 MG/G
OINTMENT TOPICAL
Qty: 30 G | Refills: 5 | Status: SHIPPED | OUTPATIENT
Start: 2024-05-22

## 2024-05-22 RX ORDER — ATENOLOL 50 MG/1
50 TABLET ORAL DAILY
Qty: 90 TABLET | Refills: 3 | Status: SHIPPED | OUTPATIENT
Start: 2024-05-22 | End: 2024-06-18

## 2024-05-22 NOTE — PROGRESS NOTES
Problem List Items Addressed This Visit          ENT    Allergic rhinitis    Overview     Prn Claritin helps             Pulmonary    Mild intermittent asthma    Overview     Chronic hx asthma; triggered by environmental allergies and exercise-induced   - prn flonase, claritin, albuterol inhaler ( 1-2x per month)              Cardiac/Vascular    Essential hypertension    Overview     -at goal today  - Current Hypertension Medications:   Hypertension Medications               atenoloL (TENORMIN) 50 MG tablet Take 1 tablet (50 mg total) by mouth once daily.          -continue lifestyle modification with low sodium diet and exercise   -discussed hypertension disease course and importance of treating high blood pressure  -patient understood and advised of risk of untreated blood pressure.  ER precautions were given   for symptoms of hypertensive urgency and emergency.           Relevant Medications    atenoloL (TENORMIN) 50 MG tablet       Endocrine    Hypothyroidism    Overview     US Thyroid 2/'21    Nontoxic goiter, unspecified     TECHNIQUE:  Grayscale and color Doppler images of the thyroid gland are obtained for interpretation.     COMPARISON:  None     FINDINGS:  The right lobe of the thyroid measures 3.1 x 0.9 x 1.0 cm.  The left lobe measures 3.5 x 0.9 x 0.9 cm.  The thyroid isthmus measures 4 mm in thickness.  The thyroid parenchyma is heterogeneous.  There is a 4 x 8 x 7 mm well-circumscribed nodule in the mid right lobe of the thyroid.     Impression:     Heterogeneous thyroid parenchyma with a subcentimeter nodule in the mid right lobe which does not meet criteria for percutaneous sampling.    Chronic hx; stable on synthroid  - denies symptoms   Lab Results   Component Value Date    TSH 2.086 05/24/2022     - cont current dose as rxd, will repeat TSH            Relevant Medications    levothyroxine (SYNTHROID) 75 MCG tablet    Class 3 severe obesity due to excess calories with serious comorbidity and body  mass index (BMI) of 40.0 to 44.9 in adult    Overview     Wt Readings from Last 3 Encounters:   05/22/24 0759 103.2 kg (227 lb 8 oz)   08/02/23 1441 98 kg (216 lb 1.6 oz)   06/15/23 1414 96.8 kg (213 lb 8 oz)     General weight loss/lifestyle modification strategies discussed: limit sugary drinks, exercise 3-5x per week  Informal exercise measures discussed, e.g. taking stairs instead of elevator.    previously on Optivia and lost 40 lbs; however, has gained >40lb back             Other    Annual physical exam    Overview     Complete history and physical was completed today.    Complete and thorough medication reconciliation was performed.  Discussed risks and benefits of medications.    Advised patient on orders and health maintenance.    Continue current medications listed on your summary sheet.    All questions were answered. Patient had no further concerns. Advised of diagnoses and plan. Follow up as planned or return sooner if symptoms persist or worsen.           Relevant Orders    CBC Without Differential     Other Visit Diagnoses       Encounter for screening mammogram for malignant neoplasm of breast    -  Primary    Relevant Orders    Mammo Digital Screening Bilat w/ Saulo    Need for vaccination for Strep pneumoniae        Relevant Medications    pneumoc 20-joslyn conj-dip cr(PF) (PREVNAR-20 (PF)) injection Syrg 0.5 mL (Completed)    Cold sore        Relevant Medications    acyclovir 5% (ZOVIRAX) 5 % ointment    Fatigue, unspecified type        Relevant Orders    Insulin, Random    17-Hydroxyprogesterone    Prolactin    Follicle Stimulating Hormone    Luteinizing Hormone    Testosterone    Cortisol, 8AM    DHEA                  Follow up in about 1 year (around 5/22/2025) for all my labs today, mmg and dexa next month, vaccine today, 1st avail pap with me.    Milla Delgado MD  _________________________________________________________________________      Patient ID: Juliana Ochoa is a 63 y.o.  "female.    Chief Complaint:  Encounter for annual exam    HPI: Patient here for a comprehensive physical exam.    She recently hired a ; however, she has not been going. Reports she needs more motivation. She works in banking. Daughter lives in [place].     Reports intermittent swelling to ble after long day at work. Reports she is not exercising or walking as much as before. She takes prn "fluid pill" which helps swelling.     Otherwise, patient has been feeling well. No additional concerns. Denies nausea, vomiting, fevers, chills, cp, sob, abdominal pain, fatigue.     Hx and meds reviewed      Health Maintenance Topics with due status: Not Due       Topic Last Completion Date    Colorectal Cancer Screening 2016    Influenza Vaccine 10/25/2016    TETANUS VACCINE 2019    Hemoglobin A1c (Diabetic Prevention Screening) 2022    Lipid Panel 2022        ==============================================  History reviewed.   Health Maintenance Due   Topic Date Due    DEXA Scan  Never done    Shingles Vaccine (1 of 2) Never done    RSV Vaccine (Age 60+ and Pregnant patients) (1 - 1-dose 60+ series) Never done    COVID-19 Vaccine (2 - - season) 2023    Mammogram  06/15/2024       Past Medical History:  Past Medical History:   Diagnosis Date    Asthma     Esophageal dilatation     GERD (gastroesophageal reflux disease)     Hypertension     Hypothyroidism     Peptic ulcer     Rosacea      Past Surgical History:   Procedure Laterality Date     SECTION      UPPER GASTROINTESTINAL ENDOSCOPY       Review of patient's allergies indicates:  No Known Allergies  Current Outpatient Medications on File Prior to Visit   Medication Sig Dispense Refill    albuterol (PROVENTIL/VENTOLIN HFA) 90 mcg/actuation inhaler INHALE TWO PUFFS INTO THE LUNGS EVERY 4 HOURS AS NEEDED for SHORTNESS OF BREATH 25.5 g 1    clotrimazole-betamethasone 1-0.05% (LOTRISONE) cream Apply topically 2 (two) times daily. " 45 g 1    hydrocortisone 2.5 % cream APPLY to RASH on face when flared twice daily  0    meloxicam (MOBIC) 15 MG tablet Take 15 mg by mouth.      [DISCONTINUED] acyclovir 5% (ZOVIRAX) 5 % ointment Apply topically 5 (five) times daily. 30 g 5    [DISCONTINUED] atenoloL (TENORMIN) 50 MG tablet TAKE 1 TABLET BY MOUTH once DAILY 90 tablet 3    [DISCONTINUED] levothyroxine (SYNTHROID) 75 MCG tablet Take 1 tablet (75 mcg total) by mouth before breakfast. 90 tablet 3    [DISCONTINUED] baclofen (LIORESAL) 10 MG tablet Take 10 mg by mouth. (Patient not taking: Reported on 5/22/2024)      [DISCONTINUED] ciprofloxacin HCl (CILOXAN) 0.3 % ophthalmic solution Place 1 drop into the left eye every 4 (four) hours. (Patient not taking: Reported on 5/22/2024) 10 mL 0    [DISCONTINUED] erythromycin (ROMYCIN) ophthalmic ointment Place into the left eye every 6 (six) hours. (Patient not taking: Reported on 5/22/2024) 3.5 g 0    [DISCONTINUED] montelukast (SINGULAIR) 10 mg tablet 1 tablet in the evening Orally Once a day (Patient not taking: Reported on 5/22/2024)       No current facility-administered medications on file prior to visit.     Social History     Socioeconomic History    Marital status: Single   Tobacco Use    Smoking status: Never    Smokeless tobacco: Never   Substance and Sexual Activity    Alcohol use: Yes     Alcohol/week: 0.0 standard drinks of alcohol     Comment: occasional    Drug use: No     Family History   Problem Relation Name Age of Onset    Heart disease Mother      Glaucoma Mother      Stroke Father      Coronary artery disease Father            Review of Systems   Constitutional: Negative for chills, fatigue, fever and unexpected weight change.   HENT: Negative for ear pain and sore throat.    Eyes: Negative for redness and visual disturbance.   Respiratory: Negative for cough and shortness of breath.    Cardiovascular: Negative for chest pain and palpitations.   Gastrointestinal: Negative for nausea and  vomiting.   Musculoskeletal: Negative for arthralgias and myalgias.   Skin: Negative for rash and wound.   Neurological: Negative for weakness and headaches.         Objective:    Nursing note and vitals reviewed.  Vitals:    05/22/24 0759   BP: 132/87   Pulse: 79   Resp: 18   Temp: 97.3 °F (36.3 °C)     Body mass index is 41.61 kg/m².     Physical Exam   Constitutional: oriented to person, place, and time. well-developed and well-nourished. No distress.   HENT: WNL  Head: Normocephalic and atraumatic.   Eyes: Pupils are equal, round, and reactive to light. EOM are normal.   Neck: Normal range of motion. Neck supple.   Cardiovascular: Normal rate, regular rhythm, normal heart sounds and intact distal pulses. No murmur heard.  Pulmonary/Chest: Effort normal and breath sounds normal. No respiratory distress. no wheezes.   GI: soft, no ttp, non-distended   Musculoskeletal: Normal range of motion. Trace ble edema to ankles  Neurological: CN II-XII intact  Skin: warm and dry. Capillary refill takes less than 2 seconds.   Psychiatric: normal mood and affect. behavior is normal.           We Offer Telehealth & Same Day Appointments!   Book your Telehealth appointment with me through my nurse or   Clinic appointments on [facility]!  [phone]     To Schedule appointments online, go to [facility]: [email or web address]

## 2024-05-22 NOTE — PATIENT INSTRUCTIONS
Wilson Andrews,     If you are due for any health screening(s) below please notify me so we can arrange them to be ordered and scheduled. Most healthy patients at your age complete them, but you are free to accept or refuse.     If you can't do it, I'll definitely understand. If you can, I'd certainly appreciate it!    All of your core healthy metrics are met.

## 2024-05-23 ENCOUNTER — PATIENT MESSAGE (OUTPATIENT)
Dept: ADMINISTRATIVE | Facility: HOSPITAL | Age: 64
End: 2024-05-23
Payer: COMMERCIAL

## 2024-05-24 ENCOUNTER — PATIENT OUTREACH (OUTPATIENT)
Dept: ADMINISTRATIVE | Facility: HOSPITAL | Age: 64
End: 2024-05-24
Payer: COMMERCIAL

## 2024-05-24 NOTE — PROGRESS NOTES
Replying to Campaign Questionnaire for Overdue HM: MAMMO/DEXA    Mammo and Dexa previously scheduled for 06/19

## 2024-05-27 LAB — DHEA SERPL-MCNC: 0.43 NG/ML (ref 0.63–4.7)

## 2024-05-27 NOTE — PROGRESS NOTES
I have sent a msg to patient with the following interpretation (see below):    Your cholesterol is mildly elevated. You can add an OTC (over the counter) omega 3 fatty acid/ fish oil tabs. Continue to limit fatty foods; participate in regular exercise. We will recheck in 6-12 months.     A1c (3 month average of blood glucose) is elevated to 5.8 = PREDIABETES.  prediabetes (A1c 5.7-6.4); diabetes = A1c >6.5.  Please limit sugary drinks (sodas, juices, sweet teas); please participate in regular daily exercise 30min 3-5 days weekly.   We will repeat in 6 months       Please do not hesitate to call or message with any questions or concerns    Milla Delgado MD

## 2024-05-28 LAB — 17OHP SERPL-MCNC: <31 NG/DL (ref 32–272)

## 2024-06-18 DIAGNOSIS — I10 ESSENTIAL HYPERTENSION: ICD-10-CM

## 2024-06-18 RX ORDER — ATENOLOL 50 MG/1
50 TABLET ORAL
Qty: 90 TABLET | Refills: 3 | Status: SHIPPED | OUTPATIENT
Start: 2024-06-18

## 2024-06-18 NOTE — TELEPHONE ENCOUNTER
Refill Decision Note   Juliana Ochoa  is requesting a refill authorization.    Brief Assessment and Rationale for Refill:   Approve       Medication Therapy Plan:         Comments:     Note composed:9:33 AM 06/18/2024

## 2024-06-18 NOTE — TELEPHONE ENCOUNTER
No care due was identified.  [facility] Embedded Care Due Messages. Reference number: [id number].   6/18/2024 8:03:42 AM CDT

## 2024-06-25 ENCOUNTER — PATIENT MESSAGE (OUTPATIENT)
Dept: FAMILY MEDICINE | Facility: CLINIC | Age: 64
End: 2024-06-25
Payer: COMMERCIAL

## 2024-07-15 DIAGNOSIS — B00.1 COLD SORE: ICD-10-CM

## 2024-07-15 RX ORDER — ACYCLOVIR 50 MG/G
OINTMENT TOPICAL
Qty: 30 G | Refills: 5 | Status: CANCELLED | OUTPATIENT
Start: 2024-07-15

## 2024-07-15 RX ORDER — ACYCLOVIR 50 MG/G
OINTMENT TOPICAL
Qty: 30 G | Refills: 5 | Status: SHIPPED | OUTPATIENT
Start: 2024-07-15

## 2024-07-15 NOTE — TELEPHONE ENCOUNTER
No care due was identified.  [facility] Embedded Care Due Messages. Reference number: [id number].   7/15/2024 4:21:06 PM CDT

## 2024-07-15 NOTE — TELEPHONE ENCOUNTER
No care due was identified.  [facility] Embedded Care Due Messages. Reference number: [id number].   7/15/2024 2:56:58 PM CDT

## 2024-07-15 NOTE — TELEPHONE ENCOUNTER
----- Message from Linda Nolen sent at 7/15/2024  4:14 PM CDT -----  Contact: Juliana Andrews is requesting a call back to find out why her request for a refill of acyclovir 5% (ZOVIRAX) 5 % ointment was denied. Please give her a call at [phone]

## 2024-07-18 ENCOUNTER — TELEPHONE (OUTPATIENT)
Dept: FAMILY MEDICINE | Facility: CLINIC | Age: 64
End: 2024-07-18
Payer: COMMERCIAL

## 2024-07-18 NOTE — TELEPHONE ENCOUNTER
I spoke with the patient about this. Pt informed PA has been complete and we are waiting for a response. Pt will reach out to insurance for a status update.     Waiting for Payer Response   7/15/2024  4:49 PM

## 2024-07-18 NOTE — TELEPHONE ENCOUNTER
----- Message from Yuki Oliva sent at 7/18/2024 11:06 AM CDT -----  Type:  RX Refill Request    Who Called: pt  Refill or New Rx:Refill  RX Name and Strength:acyclovir 5% (ZOVIRAX) 5 % ointment  How is the patient currently taking it? (ex. 1XDay):?  Is this a 30 day or 90 day RX:?  Preferred Pharmacy with phone number:.  [place], [place] - [address] [address]  [address] [address]  [facility] [address]  Phone: [phone] Fax: [phone]  Local or Mail Order:local  Ordering Provider:Dr Delgado  Would the patient rather a call back or a response via [facility]? call  Best Call Back Number:[phone]  Additional Information: States the Pharmacy sent has sent a request for a Pre Authorization form and they just sent the form again. States if its out of pocket, it will cost her 800.00 and she is not going to get it.     Thank you

## 2024-08-22 ENCOUNTER — TELEPHONE (OUTPATIENT)
Dept: PRIMARY CARE CLINIC | Facility: CLINIC | Age: 64
End: 2024-08-22
Payer: COMMERCIAL

## 2024-08-22 ENCOUNTER — TELEPHONE (OUTPATIENT)
Dept: FAMILY MEDICINE | Facility: CLINIC | Age: 64
End: 2024-08-22
Payer: COMMERCIAL

## 2024-08-22 DIAGNOSIS — I48.92 ATRIAL FIB/FLUTTER, TRANSIENT: Primary | ICD-10-CM

## 2024-08-22 DIAGNOSIS — I48.91 ATRIAL FIB/FLUTTER, TRANSIENT: Primary | ICD-10-CM

## 2024-08-22 NOTE — TELEPHONE ENCOUNTER
Orders Placed This Encounter   Procedures    Ambulatory referral/consult to Cardiology     Standing Status:   Future     Standing Expiration Date:   9/22/2025     Referral Priority:   Routine     Referral Type:   Consultation     Referral Reason:   Specialty Services Required     Referred to Provider:   Burton Petty MD     Requested Specialty:   Cardiology     Number of Visits Requested:   1

## 2024-08-22 NOTE — TELEPHONE ENCOUNTER
Pt would like to be seen by a local cardiologist . She is asking for dr mckeon personal recommendations on a dr here in [place]?

## 2024-08-22 NOTE — TELEPHONE ENCOUNTER
----- Message from Melissa Henderson sent at 8/22/2024  1:13 PM CDT -----  .Name of Caller:.Juliana Ochoa   Symptoms:afib  Would the patient rather a call back or a response via [facility]? Call back  Best Call Back Number:.[phone] (home) [phone] (work)   Additional Information: Patent stated her cardiologist advised her she is in AFIB. She would like a recommendation from Dr. Delgado for a local cardiologist. Please call the patient back to advise.

## 2024-08-26 PROBLEM — Z00.00 ANNUAL PHYSICAL EXAM: Status: RESOLVED | Noted: 2020-12-16 | Resolved: 2024-08-26

## 2024-09-10 ENCOUNTER — TELEPHONE (OUTPATIENT)
Dept: FAMILY MEDICINE | Facility: CLINIC | Age: 64
End: 2024-09-10
Payer: COMMERCIAL

## 2024-09-10 NOTE — TELEPHONE ENCOUNTER
----- Message from Linda Nolen sent at 9/10/2024  4:03 PM CDT -----  Contact: Juliana  Type:  Needs Medical Advice    Who Called: Juliana  Symptoms (please be specific): Juliana went to see her cardiologist and he noticed her constant deep breaths and suggested she get on a mild anxiety medication. She is wanting to know if she can start one since Dr. Delgado also suggested it before  Pharmacy name and phone #:    [facility] [place], [place] - [address] [address]  [address]  [facility] [address]  Phone: [phone] Fax: [phone]    Would the patient rather a call back or a response via [facility]? call  Best Call Back Number: [phone]   Additional Information:

## 2025-02-05 DIAGNOSIS — Z78.0 MENOPAUSE: ICD-10-CM

## 2025-04-17 ENCOUNTER — PATIENT OUTREACH (OUTPATIENT)
Dept: ADMINISTRATIVE | Facility: HOSPITAL | Age: 65
End: 2025-04-17
Payer: COMMERCIAL

## 2025-04-17 NOTE — PROGRESS NOTES
Working mammogram report; Chart searched; Called pt, states she will schedule later.    (4) no impairment

## 2025-05-18 DIAGNOSIS — E03.9 ACQUIRED HYPOTHYROIDISM: ICD-10-CM

## 2025-05-19 RX ORDER — LEVOTHYROXINE SODIUM 75 UG/1
75 TABLET ORAL
Qty: 90 TABLET | Refills: 3 | Status: SHIPPED | OUTPATIENT
Start: 2025-05-19

## 2025-05-19 NOTE — TELEPHONE ENCOUNTER
Name: Divina YOB: 2002   MR: [id number]      Group Topic:  BH Group Psychotherapy  Group Date:  8/11/2020  Start Time:   6:15 PM  End Time:   7:15 PM  Facilitators:  Matilde Soriano LPC   Department::  [facility]    Due to COVID-19 precautions, this visit was performed via live interactive two-way video with patient's verbal consent.   Clinician Location:Home.  Patient Location: Home.  Verified patient identity:  [x] Yes    D:  Client was present for DBT group focused on emotion regulation module. The group participated in a breathing technique; 4-7-8. The group discussed handout 19; how to build mastery and cope ahead of time with difficult situations. The group discussed examples of difficult situations and how to cope effectively and rehearse the actions.    A:  Writer provided supportive psychotherapy. Writer explored how dialectical behavior therapy can positively impact current struggles.       Level of Participation: active  Quality of Participation: cooperative  Interactions with others: appropriate and gave feedback  Mood/Affect: appropriate  Triggers (if applicable): N/A  Cognition: coherent/clear  Progress: Moderate  Response: Client was a good encourager to other group members.  Plan: Continue with Group    Patient Active Problem List   Diagnosis   • Vitamin D deficiency   • Class 1 obesity due to excess calories without serious comorbidity with body mass index (BMI) of 34.0 to 34.9 in adult   • Syncope   • Major depressive disorder, single episode, severe (CMS/HCC)   • EDIS (generalized anxiety disorder)        Visit Diagnoses:  F33.1 Major depressive disorder, recurrent episode, moderate   
Care Due:                  Date            Visit Type   Department     Provider  --------------------------------------------------------------------------------                                EP -                              PRIMARY      [facility] FAMILY  Last Visit: 05-      CARE ([facility])   MEDICINE       Milla Delgado  Next Visit: None Scheduled  None         None Found                                                            Last  Test          Frequency    Reason                     Performed    Due Date  --------------------------------------------------------------------------------    Office Visit  15 months..  acyclovir, atenoloL,       05-   08-                             levothyroxine............    CBC.........  12 months..  acyclovir................  05- 05-    Cr..........  12 months..  acyclovir................  05- 05-    TSH.........  12 months..  levothyroxine............  05- 05-    [facility] Embedded Care Due Messages. Reference number: [id number].   5/18/2025 8:05:54 AM CDT  
Refill Routing Note   Medication(s) are not appropriate for processing by [facility] for the following reason(s):        Required labs outdated    [facility] action(s):  Defer   Requires appointment : Yes   Requires labs : Yes             Appointments  past 12m or future 3m with PCP    Date Provider   Last Visit   5/22/2024 Milla Delgado MD   Next Visit   Visit date not found Milla Delgado MD   ED visits in past 90 days: 0        Note composed:5:34 AM 05/19/2025                          
negative...

## 2025-05-20 ENCOUNTER — OFFICE VISIT (OUTPATIENT)
Dept: FAMILY MEDICINE | Facility: CLINIC | Age: 65
End: 2025-05-20
Payer: COMMERCIAL

## 2025-05-20 ENCOUNTER — LAB VISIT (OUTPATIENT)
Dept: LAB | Facility: HOSPITAL | Age: 65
End: 2025-05-20
Attending: PHYSICIAN ASSISTANT
Payer: COMMERCIAL

## 2025-05-20 VITALS
SYSTOLIC BLOOD PRESSURE: 120 MMHG | WEIGHT: 221 LBS | HEIGHT: 62 IN | RESPIRATION RATE: 14 BRPM | OXYGEN SATURATION: 99 % | DIASTOLIC BLOOD PRESSURE: 78 MMHG | HEART RATE: 68 BPM | BODY MASS INDEX: 40.67 KG/M2

## 2025-05-20 DIAGNOSIS — Z13.6 ENCOUNTER FOR LIPID SCREENING FOR CARDIOVASCULAR DISEASE: ICD-10-CM

## 2025-05-20 DIAGNOSIS — E03.9 ACQUIRED HYPOTHYROIDISM: ICD-10-CM

## 2025-05-20 DIAGNOSIS — I10 ESSENTIAL HYPERTENSION: ICD-10-CM

## 2025-05-20 DIAGNOSIS — E66.01 CLASS 3 SEVERE OBESITY DUE TO EXCESS CALORIES WITH SERIOUS COMORBIDITY AND BODY MASS INDEX (BMI) OF 40.0 TO 44.9 IN ADULT: ICD-10-CM

## 2025-05-20 DIAGNOSIS — R21 RASH AND NONSPECIFIC SKIN ERUPTION: Primary | ICD-10-CM

## 2025-05-20 DIAGNOSIS — E66.813 CLASS 3 SEVERE OBESITY DUE TO EXCESS CALORIES WITH SERIOUS COMORBIDITY AND BODY MASS INDEX (BMI) OF 40.0 TO 44.9 IN ADULT: ICD-10-CM

## 2025-05-20 DIAGNOSIS — Z13.220 ENCOUNTER FOR LIPID SCREENING FOR CARDIOVASCULAR DISEASE: ICD-10-CM

## 2025-05-20 DIAGNOSIS — R73.03 PREDIABETES: ICD-10-CM

## 2025-05-20 DIAGNOSIS — I48.0 PAROXYSMAL ATRIAL FIBRILLATION: ICD-10-CM

## 2025-05-20 LAB
ABSOLUTE EOSINOPHIL (OHS): 0.15 K/UL
ABSOLUTE MONOCYTE (OHS): 0.54 K/UL (ref 0.3–1)
ABSOLUTE NEUTROPHIL COUNT (OHS): 3.31 K/UL (ref 1.8–7.7)
ALBUMIN SERPL BCP-MCNC: 3.9 G/DL (ref 3.5–5.2)
ALP SERPL-CCNC: 65 UNIT/L (ref 40–150)
ALT SERPL W/O P-5'-P-CCNC: 19 UNIT/L (ref 10–44)
ANION GAP (OHS): 10 MMOL/L (ref 8–16)
AST SERPL-CCNC: 18 UNIT/L (ref 11–45)
BASOPHILS # BLD AUTO: 0.06 K/UL
BASOPHILS NFR BLD AUTO: 0.9 %
BILIRUB SERPL-MCNC: 1.3 MG/DL (ref 0.1–1)
BUN SERPL-MCNC: 15 MG/DL (ref 8–23)
CALCIUM SERPL-MCNC: 9.2 MG/DL (ref 8.7–10.5)
CHLORIDE SERPL-SCNC: 105 MMOL/L (ref 95–110)
CHOLEST SERPL-MCNC: 216 MG/DL (ref 120–199)
CHOLEST/HDLC SERPL: 3.4 {RATIO} (ref 2–5)
CO2 SERPL-SCNC: 24 MMOL/L (ref 23–29)
CREAT SERPL-MCNC: 0.7 MG/DL (ref 0.5–1.4)
EAG (OHS): 120 MG/DL (ref 68–131)
ERYTHROCYTE [DISTWIDTH] IN BLOOD BY AUTOMATED COUNT: 14.1 % (ref 11.5–14.5)
GFR SERPLBLD CREATININE-BSD FMLA CKD-EPI: >60 ML/MIN/1.73/M2
GLUCOSE SERPL-MCNC: 96 MG/DL (ref 70–110)
HBA1C MFR BLD: 5.8 % (ref 4–5.6)
HCT VFR BLD AUTO: 41.8 % (ref 37–48.5)
HDLC SERPL-MCNC: 63 MG/DL (ref 40–75)
HDLC SERPL: 29.2 % (ref 20–50)
HGB BLD-MCNC: 13.3 GM/DL (ref 12–16)
IMM GRANULOCYTES # BLD AUTO: 0.01 K/UL (ref 0–0.04)
IMM GRANULOCYTES NFR BLD AUTO: 0.2 % (ref 0–0.5)
LDLC SERPL CALC-MCNC: 134.8 MG/DL (ref 63–159)
LYMPHOCYTES # BLD AUTO: 2.26 K/UL (ref 1–4.8)
MCH RBC QN AUTO: 29 PG (ref 27–31)
MCHC RBC AUTO-ENTMCNC: 31.8 G/DL (ref 32–36)
MCV RBC AUTO: 91 FL (ref 82–98)
NONHDLC SERPL-MCNC: 153 MG/DL
NUCLEATED RBC (/100WBC) (OHS): 0 /100 WBC
PLATELET # BLD AUTO: 222 K/UL (ref 150–450)
PMV BLD AUTO: 10.9 FL (ref 9.2–12.9)
POTASSIUM SERPL-SCNC: 4.1 MMOL/L (ref 3.5–5.1)
PROT SERPL-MCNC: 7.3 GM/DL (ref 6–8.4)
RBC # BLD AUTO: 4.59 M/UL (ref 4–5.4)
RELATIVE EOSINOPHIL (OHS): 2.4 %
RELATIVE LYMPHOCYTE (OHS): 35.7 % (ref 18–48)
RELATIVE MONOCYTE (OHS): 8.5 % (ref 4–15)
RELATIVE NEUTROPHIL (OHS): 52.3 % (ref 38–73)
SODIUM SERPL-SCNC: 139 MMOL/L (ref 136–145)
TRIGL SERPL-MCNC: 91 MG/DL (ref 30–150)
TSH SERPL-ACNC: 1.94 UIU/ML (ref 0.4–4)
WBC # BLD AUTO: 6.33 K/UL (ref 3.9–12.7)

## 2025-05-20 PROCEDURE — 1160F RVW MEDS BY RX/DR IN RCRD: CPT | Mod: CPTII,S$GLB,, | Performed by: PHYSICIAN ASSISTANT

## 2025-05-20 PROCEDURE — 3074F SYST BP LT 130 MM HG: CPT | Mod: CPTII,S$GLB,, | Performed by: PHYSICIAN ASSISTANT

## 2025-05-20 PROCEDURE — 3008F BODY MASS INDEX DOCD: CPT | Mod: CPTII,S$GLB,, | Performed by: PHYSICIAN ASSISTANT

## 2025-05-20 PROCEDURE — 82374 ASSAY BLOOD CARBON DIOXIDE: CPT

## 2025-05-20 PROCEDURE — 83036 HEMOGLOBIN GLYCOSYLATED A1C: CPT

## 2025-05-20 PROCEDURE — 1101F PT FALLS ASSESS-DOCD LE1/YR: CPT | Mod: CPTII,S$GLB,, | Performed by: PHYSICIAN ASSISTANT

## 2025-05-20 PROCEDURE — 3078F DIAST BP <80 MM HG: CPT | Mod: CPTII,S$GLB,, | Performed by: PHYSICIAN ASSISTANT

## 2025-05-20 PROCEDURE — 36415 COLL VENOUS BLD VENIPUNCTURE: CPT | Mod: PO

## 2025-05-20 PROCEDURE — 84443 ASSAY THYROID STIM HORMONE: CPT

## 2025-05-20 PROCEDURE — 80061 LIPID PANEL: CPT

## 2025-05-20 PROCEDURE — 85025 COMPLETE CBC W/AUTO DIFF WBC: CPT

## 2025-05-20 PROCEDURE — 1126F AMNT PAIN NOTED NONE PRSNT: CPT | Mod: CPTII,S$GLB,, | Performed by: PHYSICIAN ASSISTANT

## 2025-05-20 PROCEDURE — 99214 OFFICE O/P EST MOD 30 MIN: CPT | Mod: S$GLB,,, | Performed by: PHYSICIAN ASSISTANT

## 2025-05-20 PROCEDURE — 1159F MED LIST DOCD IN RCRD: CPT | Mod: CPTII,S$GLB,, | Performed by: PHYSICIAN ASSISTANT

## 2025-05-20 PROCEDURE — 3288F FALL RISK ASSESSMENT DOCD: CPT | Mod: CPTII,S$GLB,, | Performed by: PHYSICIAN ASSISTANT

## 2025-05-20 PROCEDURE — 99999 PR PBB SHADOW E&M-EST. PATIENT-LVL V: CPT | Mod: PBBFAC,,, | Performed by: PHYSICIAN ASSISTANT

## 2025-05-20 PROCEDURE — G2211 COMPLEX E/M VISIT ADD ON: HCPCS | Mod: S$GLB,,, | Performed by: PHYSICIAN ASSISTANT

## 2025-05-20 RX ORDER — APIXABAN 5 MG/1
5 TABLET, FILM COATED ORAL 2 TIMES DAILY
COMMUNITY
Start: 2025-04-23

## 2025-05-20 RX ORDER — TRIAMCINOLONE ACETONIDE 1 MG/G
CREAM TOPICAL 2 TIMES DAILY
Qty: 80 G | Refills: 0 | Status: SHIPPED | OUTPATIENT
Start: 2025-05-20 | End: 2026-05-20

## 2025-05-20 RX ORDER — SOTALOL HYDROCHLORIDE 80 MG/1
80 TABLET ORAL 2 TIMES DAILY
COMMUNITY
Start: 2024-12-17 | End: 2025-05-20

## 2025-05-20 NOTE — PROGRESS NOTES
Assessment/Plan:    1. Rash and nonspecific skin eruption  Overview:  -start topical steroid as prescribed   -recommend OTC benadryl for itching  -avoid touching/scratching  -follow up if no improvement in symptoms   -ER precautions for severe or worsening of symptoms     Orders:  -     triamcinolone acetonide 0.1% (KENALOG) 0.1 % cream; Apply topically 2 (two) times daily.  Dispense: 80 g; Refill: 0    2. Paroxysmal atrial fibrillation  Overview:  -followed by cardiology (Dr. Monae)  -remains on Eliquis for anticoagulation  -also on Atenolol for rate control    3. Essential hypertension  Overview:  -at goal today  - Current Hypertension Medications:   Hypertension Medications              atenoloL (TENORMIN) 50 MG tablet TAKE 1 TABLET BY MOUTH once DAILY        -continue lifestyle modification with low sodium diet and exercise   -discussed hypertension disease course and importance of treating high blood pressure  -patient understood and advised of risk of untreated blood pressure.  ER precautions were given   for symptoms of hypertensive urgency and emergency.      Orders:  -     CBC Auto Differential; Future; Expected date: 05/20/2025  -     Comprehensive Metabolic Panel; Future; Expected date: 05/20/2025    4. Acquired hypothyroidism  Overview:  US Thyroid 2/'21    Nontoxic goiter, unspecified     TECHNIQUE:  Grayscale and color Doppler images of the thyroid gland are obtained for interpretation.     COMPARISON:  None     FINDINGS:  The right lobe of the thyroid measures 3.1 x 0.9 x 1.0 cm.  The left lobe measures 3.5 x 0.9 x 0.9 cm.  The thyroid isthmus measures 4 mm in thickness.  The thyroid parenchyma is heterogeneous.  There is a 4 x 8 x 7 mm well-circumscribed nodule in the mid right lobe of the thyroid.     Impression:     Heterogeneous thyroid parenchyma with a subcentimeter nodule in the mid right lobe which does not meet criteria for percutaneous sampling.    Chronic hx; stable on synthroid  -  denies symptoms   Lab Results   Component Value Date    TSH 2.425 05/22/2024     - cont current dose as rxd, will repeat TSH       Orders:  -     TSH; Future; Expected date: 05/20/2025    5. Prediabetes  Overview:  Lab Results   Component Value Date    HGBA1C 5.8 (H) 05/22/2024   -managed with diet   -due for repeat A1c    Orders:  -     Hemoglobin A1C; Future; Expected date: 05/20/2025    6. Class 3 severe obesity due to excess calories with serious comorbidity and body mass index (BMI) of 40.0 to 44.9 in adult  Overview:  Wt Readings from Last 3 Encounters:   05/20/25 1015 100.2 kg (221 lb)   05/22/24 0759 103.2 kg (227 lb 8 oz)   08/02/23 1441 98 kg (216 lb 1.6 oz)     General weight loss/lifestyle modification strategies discussed: limit sugary drinks, exercise 3-5x per week  Informal exercise measures discussed, e.g. taking stairs instead of elevator.    previously on Optivia and lost 40 lbs; however, has gained >40lb back       7. Encounter for lipid screening for cardiovascular disease  -     Lipid Panel; Future; Expected date: 05/20/2025    Visit today included increased complexity associated with the care of the episodic problem addressed and managing the longitudinal care of the patient due to the serious and/or complex managed problem(s).    Follow up in about 1 month (around 6/20/2025), or if symptoms worsen or fail to improve, for annual visit w/ med refills.    Silvina Spain PA-C  _____________________________________________________________________________________________________________________________________________________    CC: rash    HPI: Patient is in clinic today as an established patient here for rash.     HISTORY OF PRESENT ILLNESS:  She reports leg redness present for 6-7 months with intermittent fading and returning. She experiences ankle swelling by evening, particularly after sitting all day, but denies associated pain. She developed new itchy bumps on her right upper arm and legs  after working in yard over the weekend. She has attempted multiple home remedies including washing with soap and water, applying alcohol, Benadryl, and calamine lotion, with minimal relief.    MEDICAL HISTORY:  She has atrial fibrillation diagnosed approximately 1.5 years ago.    MEDICATIONS:  She takes Eliquis and Atenolol for atrial fibrillation and Levothyroxine for thyroid condition. She previously tried Sotalol but discontinued due to side effects after weaning off with cardiology approval.    SOCIAL HISTORY:  She denies smoking. She previously consumed alcohol on weekends but has discontinued use due to cardiac concerns. She is concerned for circulation issues contributing to leg redness.     No other complaints today.     Past Medical History:   Diagnosis Date    Asthma     Esophageal dilatation     GERD (gastroesophageal reflux disease)     Hypertension     Hypothyroidism     Peptic ulcer     Rosacea      Past Surgical History:   Procedure Laterality Date     SECTION      UPPER GASTROINTESTINAL ENDOSCOPY       Review of patient's allergies indicates:  No Known Allergies  Social History[1]  Family History   Problem Relation Name Age of Onset    Heart disease Mother      Glaucoma Mother      Stroke Father      Coronary artery disease Father       Medications Ordered Prior to Encounter[2]    Review of Systems   Constitutional:  Negative for chills, diaphoresis, fatigue and fever.   HENT:  Negative for congestion, ear pain, postnasal drip, sinus pain and sore throat.    Eyes:  Negative for pain and redness.   Respiratory:  Negative for cough, chest tightness and shortness of breath.    Cardiovascular:  Negative for chest pain and leg swelling.   Gastrointestinal:  Negative for abdominal pain, constipation, diarrhea, nausea and vomiting.   Genitourinary:  Negative for dysuria and hematuria.   Musculoskeletal:  Negative for arthralgias and joint swelling.   Skin:  Positive for rash.   Neurological:   "Negative for dizziness, syncope and headaches.   Psychiatric/Behavioral:  Negative for dysphoric mood. The patient is not nervous/anxious.        Vitals:    05/20/25 1015   BP: 120/78   BP Location: Left arm   Patient Position: Sitting   Pulse: 68   Resp: 14   SpO2: 99%   Weight: 100.2 kg (221 lb)   Height: 5' 2" (1.575 m)       Wt Readings from Last 3 Encounters:   05/20/25 100.2 kg (221 lb)   05/22/24 103.2 kg (227 lb 8 oz)   08/02/23 98 kg (216 lb 1.6 oz)       Physical Exam  Constitutional:       General: She is not in acute distress.     Appearance: Normal appearance. She is well-developed.   HENT:      Head: Normocephalic and atraumatic.   Eyes:      Conjunctiva/sclera: Conjunctivae normal.   Cardiovascular:      Rate and Rhythm: Normal rate and regular rhythm.      Pulses: Normal pulses.      Heart sounds: Normal heart sounds. No murmur heard.  Pulmonary:      Effort: Pulmonary effort is normal. No respiratory distress.      Breath sounds: Normal breath sounds.   Abdominal:      General: Bowel sounds are normal. There is no distension.      Palpations: Abdomen is soft.      Tenderness: There is no abdominal tenderness.   Musculoskeletal:         General: Normal range of motion.      Cervical back: Normal range of motion and neck supple.   Skin:     General: Skin is warm and dry.      Findings: Rash present.      Comments: Erythematous maculopapular rash located on lower legs and R upper arm   Neurological:      General: No focal deficit present.      Mental Status: She is alert and oriented to person, place, and time.   Psychiatric:         Mood and Affect: Mood normal.         Behavior: Behavior normal.                   Health Maintenance   Topic Date Due    DEXA Scan  Never done    Shingles Vaccine (1 of 2) Never done    RSV Vaccine (Age 60+ and Pregnant patients) (1 - Risk 60-74 years 1-dose series) Never done    Mammogram  06/15/2024    COVID-19 Vaccine (2 - 2024-25 season) 09/01/2024    Influenza " Vaccine (Season Ended) 09/01/2025    Colorectal Cancer Screening  08/08/2026    Hemoglobin A1c (Diabetic Prevention Screening)  05/22/2027    Lipid Panel  05/22/2029    TETANUS VACCINE  06/19/2029    Hepatitis C Screening  Completed    Pneumococcal Vaccines (Age 50+)  Completed     DISCLAIMER: This note was compiled by using a speech recognition dictation system and therefore please be aware that typographical / speech recognition errors can and do occur.  Please contact me if you see any errors specifically.  Consent was obtained for [employer] recording system prior to the visit.          [1]   Social History  Tobacco Use    Smoking status: Never    Smokeless tobacco: Never   Substance Use Topics    Alcohol use: Yes     Alcohol/week: 0.0 standard drinks of alcohol     Comment: occasional    Drug use: No   [2]   Current Outpatient Medications on File Prior to Visit   Medication Sig Dispense Refill    acyclovir 5% (ZOVIRAX) 5 % ointment Apply topically 5 (five) times daily. 30 g 5    albuterol (PROVENTIL/VENTOLIN HFA) 90 mcg/actuation inhaler INHALE TWO PUFFS INTO THE LUNGS EVERY 4 HOURS AS NEEDED for SHORTNESS OF BREATH 25.5 g 1    atenoloL (TENORMIN) 50 MG tablet TAKE 1 TABLET BY MOUTH once DAILY 90 tablet 3    baclofen (LIORESAL) 10 MG tablet Take 1 tablet (10 mg total) by mouth 2 (two) times daily as needed.      clotrimazole-betamethasone 1-0.05% (LOTRISONE) cream Apply topically 2 (two) times daily. 45 g 1    ELIQUIS 5 mg Tab Take 5 mg by mouth 2 (two) times daily.      hydrocortisone 2.5 % cream APPLY to RASH on face when flared twice daily  0    levothyroxine (SYNTHROID) 75 MCG tablet take 1 tablet by mouth once DAILY before breakfast 90 tablet 3    meloxicam (MOBIC) 15 MG tablet Take 15 mg by mouth.      [DISCONTINUED] SOTALOL AF 80 mg tablet Take 80 mg by mouth 2 (two) times daily.       No current facility-administered medications on file prior to visit.

## 2025-05-21 ENCOUNTER — RESULTS FOLLOW-UP (OUTPATIENT)
Dept: FAMILY MEDICINE | Facility: CLINIC | Age: 65
End: 2025-05-21

## 2025-05-21 NOTE — PROGRESS NOTES
Results have been released via [employer]. Please verify that these have been viewed by patient. If not, please call patient with results.     I have sent a message to them with the following interpretation (see below).    I have reviewed your recent blood work.     A1C ABNORMAL PREDIABETES-Your A1C was slightly above normal and is considered to be in prediabetes range (5.7-6.4). Diabetes is diagnosed with an A1c of 6.5 and greater. Make sure to work on low carb diet and exercise to prevent progression to diabetes. We will continue to monitor closely.  CBC NORMAL-The CBC appears to be stable at this time with no sign of major anemia, abnormal white count or platelet abnormality.  CMP/BMP NORMAL-The electrolytes all appear stable at this time. This includes kidney functions along with routine electrolytes like sugar, potassium and sodium. The liver enzymes were noted to be stable also.  LIPID-ABNORMAL-Your cholesterol is slightly elevated, however your overall risk of cardiovascular disease is still borderline/low. I would still recommend working on lifestyle modification with low fat, low carb diet and exercise to improve these numbers and further decrease your risk of heart disease.  TSH NORMAL-ON MED-The TSH is normal. Continue current dose of thyroid medication.    Please do not hesitate to call or message with any additional questions or concerns.    Silvina Spain PA-C

## 2025-06-09 ENCOUNTER — PATIENT MESSAGE (OUTPATIENT)
Dept: FAMILY MEDICINE | Facility: CLINIC | Age: 65
End: 2025-06-09
Payer: COMMERCIAL

## 2025-06-16 ENCOUNTER — OFFICE VISIT (OUTPATIENT)
Dept: CARDIOLOGY | Facility: CLINIC | Age: 65
End: 2025-06-16
Payer: COMMERCIAL

## 2025-06-16 ENCOUNTER — HOSPITAL ENCOUNTER (OUTPATIENT)
Dept: CARDIOLOGY | Facility: HOSPITAL | Age: 65
Discharge: HOME | End: 2025-06-16
Attending: INTERNAL MEDICINE
Payer: COMMERCIAL

## 2025-06-16 VITALS
OXYGEN SATURATION: 96 % | DIASTOLIC BLOOD PRESSURE: 82 MMHG | HEART RATE: 80 BPM | WEIGHT: 227.19 LBS | SYSTOLIC BLOOD PRESSURE: 130 MMHG | HEIGHT: 62 IN | BODY MASS INDEX: 41.81 KG/M2

## 2025-06-16 DIAGNOSIS — E66.813 CLASS 3 SEVERE OBESITY DUE TO EXCESS CALORIES WITH SERIOUS COMORBIDITY AND BODY MASS INDEX (BMI) OF 40.0 TO 44.9 IN ADULT: ICD-10-CM

## 2025-06-16 DIAGNOSIS — E66.01 CLASS 3 SEVERE OBESITY DUE TO EXCESS CALORIES WITH SERIOUS COMORBIDITY AND BODY MASS INDEX (BMI) OF 40.0 TO 44.9 IN ADULT: ICD-10-CM

## 2025-06-16 DIAGNOSIS — I10 ESSENTIAL HYPERTENSION: Primary | ICD-10-CM

## 2025-06-16 DIAGNOSIS — Z76.89 ENCOUNTER TO ESTABLISH CARE: Primary | ICD-10-CM

## 2025-06-16 DIAGNOSIS — I48.0 PAROXYSMAL ATRIAL FIBRILLATION: ICD-10-CM

## 2025-06-16 DIAGNOSIS — Z76.89 ENCOUNTER TO ESTABLISH CARE: ICD-10-CM

## 2025-06-16 LAB
OHS QRS DURATION: 106 MS
OHS QTC CALCULATION: 462 MS

## 2025-06-16 PROCEDURE — 3044F HG A1C LEVEL LT 7.0%: CPT | Mod: CPTII,S$GLB,, | Performed by: INTERNAL MEDICINE

## 2025-06-16 PROCEDURE — 3079F DIAST BP 80-89 MM HG: CPT | Mod: CPTII,S$GLB,, | Performed by: INTERNAL MEDICINE

## 2025-06-16 PROCEDURE — 3288F FALL RISK ASSESSMENT DOCD: CPT | Mod: CPTII,S$GLB,, | Performed by: INTERNAL MEDICINE

## 2025-06-16 PROCEDURE — 99204 OFFICE O/P NEW MOD 45 MIN: CPT | Mod: 25,S$GLB,, | Performed by: INTERNAL MEDICINE

## 2025-06-16 PROCEDURE — 1160F RVW MEDS BY RX/DR IN RCRD: CPT | Mod: CPTII,S$GLB,, | Performed by: INTERNAL MEDICINE

## 2025-06-16 PROCEDURE — 1101F PT FALLS ASSESS-DOCD LE1/YR: CPT | Mod: CPTII,S$GLB,, | Performed by: INTERNAL MEDICINE

## 2025-06-16 PROCEDURE — 93005 ELECTROCARDIOGRAM TRACING: CPT | Mod: PO

## 2025-06-16 PROCEDURE — 3008F BODY MASS INDEX DOCD: CPT | Mod: CPTII,S$GLB,, | Performed by: INTERNAL MEDICINE

## 2025-06-16 PROCEDURE — 1159F MED LIST DOCD IN RCRD: CPT | Mod: CPTII,S$GLB,, | Performed by: INTERNAL MEDICINE

## 2025-06-16 PROCEDURE — 93010 ELECTROCARDIOGRAM REPORT: CPT | Mod: ,,, | Performed by: INTERNAL MEDICINE

## 2025-06-16 PROCEDURE — 1126F AMNT PAIN NOTED NONE PRSNT: CPT | Mod: CPTII,S$GLB,, | Performed by: INTERNAL MEDICINE

## 2025-06-16 PROCEDURE — 3075F SYST BP GE 130 - 139MM HG: CPT | Mod: CPTII,S$GLB,, | Performed by: INTERNAL MEDICINE

## 2025-06-16 PROCEDURE — 99999 PR PBB SHADOW E&M-EST. PATIENT-LVL III: CPT | Mod: PBBFAC,,, | Performed by: INTERNAL MEDICINE

## 2025-06-16 NOTE — PROGRESS NOTES
Subjective   Patient ID:  Juliana Ochoa is a 64 y.o. female who presents for evaluation of No chief complaint on file.      HPI64 yo WF with hx of atrial fib who has been followed in [place]. Was cardioverted about a year ago and did well but has not felt well for several weeks. Came in today and EKG shows atrial flutter with controlled ventricular response on atenolol and eliquis.     Review of Systems   Constitutional: Positive for malaise/fatigue. Negative for decreased appetite, fever, weight gain and weight loss.   HENT:  Negative for hearing loss and nosebleeds.    Eyes:  Negative for visual disturbance.   Cardiovascular:  Positive for palpitations. Negative for chest pain, claudication, cyanosis, dyspnea on exertion, irregular heartbeat, leg swelling, near-syncope, orthopnea, paroxysmal nocturnal dyspnea and syncope.   Respiratory:  Positive for shortness of breath. Negative for cough, hemoptysis, sleep disturbances due to breathing, snoring and wheezing.    Endocrine: Negative for cold intolerance, heat intolerance, polydipsia and polyuria.   Hematologic/Lymphatic: Negative for adenopathy and bleeding problem. Does not bruise/bleed easily.   Skin:  Negative for color change, itching, poor wound healing, rash and suspicious lesions.   Musculoskeletal:  Negative for arthritis, back pain, falls, joint pain, joint swelling, muscle cramps, muscle weakness and myalgias.   Gastrointestinal:  Negative for bloating, abdominal pain, change in bowel habit, constipation, flatus, heartburn, hematemesis, hematochezia, hemorrhoids, jaundice, melena, nausea and vomiting.   Genitourinary:  Negative for bladder incontinence, decreased libido, frequency, hematuria, hesitancy and urgency.   Neurological:  Negative for brief paralysis, difficulty with concentration, excessive daytime sleepiness, dizziness, focal weakness, headaches, light-headedness, loss of balance, numbness, vertigo and weakness.   Psychiatric/Behavioral:  " Negative for altered mental status, depression and memory loss. The patient does not have insomnia and is not nervous/anxious.    Allergic/Immunologic: Negative for environmental allergies, hives and persistent infections.          Objective     Physical Exam  Vitals and nursing note reviewed.   Constitutional:       Appearance: She is well-developed. She is obese.      Comments: /82 (BP Location: Right arm, Patient Position: Sitting)   Pulse 80   Ht 5' 2" (1.575 m)   Wt 103.1 kg (227 lb 3.2 oz)   SpO2 96%   BMI 41.56 kg/m²      HENT:      Head: Normocephalic and atraumatic.      Right Ear: External ear normal.      Left Ear: External ear normal.      Nose: Nose normal.   Eyes:      General: Lids are normal. No scleral icterus.        Right eye: No discharge.         Left eye: No discharge.      Conjunctiva/sclera: Conjunctivae normal.      Right eye: No hemorrhage.     Pupils: Pupils are equal, round, and reactive to light.   Neck:      Thyroid: No thyromegaly.      Vascular: No JVD.      Trachea: No tracheal deviation.   Cardiovascular:      Rate and Rhythm: Normal rate. Rhythm irregularly irregular.      Pulses: Intact distal pulses.      Heart sounds: Normal heart sounds. No murmur heard.     No friction rub. No gallop.   Pulmonary:      Effort: Pulmonary effort is normal. No respiratory distress.      Breath sounds: Normal breath sounds. No wheezing or rales.   Chest:      Chest wall: No tenderness.   Breasts:     Breasts are symmetrical.   Abdominal:      General: Bowel sounds are normal. There is no distension.      Palpations: Abdomen is soft. There is no hepatomegaly or mass.      Tenderness: There is no abdominal tenderness. There is no guarding or rebound.   Musculoskeletal:         General: No tenderness. Normal range of motion.      Cervical back: Normal range of motion and neck supple.   Lymphadenopathy:      Cervical: No cervical adenopathy.   Skin:     General: Skin is warm and dry.      " Coloration: Skin is not pale.      Findings: No erythema or rash.   Neurological:      Mental Status: She is alert and oriented to person, place, and time.      Cranial Nerves: No cranial nerve deficit.      Coordination: Coordination normal.      Deep Tendon Reflexes: Reflexes are normal and symmetric. Reflexes normal.   Psychiatric:         Behavior: Behavior normal.         Thought Content: Thought content normal.         Judgment: Judgment normal.            Assessment and Plan     1. Essential hypertension    2. Paroxysmal atrial fibrillation    3. Class 3 severe obesity due to excess calories with serious comorbidity and body mass index (BMI) of 40.0 to 44.9 in adult        Plan:  Explained to patient she probably needs to be cardioverted now because of symptoms and then evaluated for an ablation  She prefers to have this done thru her former cardiologist in    Informed her if she gets in any distress should go to ER       Advance Care Planning     Date: 06/16/2025